# Patient Record
Sex: FEMALE | Race: WHITE | NOT HISPANIC OR LATINO | Employment: UNEMPLOYED | URBAN - METROPOLITAN AREA
[De-identification: names, ages, dates, MRNs, and addresses within clinical notes are randomized per-mention and may not be internally consistent; named-entity substitution may affect disease eponyms.]

---

## 2017-12-18 ENCOUNTER — HISTORICAL (OUTPATIENT)
Dept: CARDIOLOGY | Facility: HOSPITAL | Age: 62
End: 2017-12-18

## 2018-05-07 ENCOUNTER — HISTORICAL (OUTPATIENT)
Dept: RADIOLOGY | Facility: HOSPITAL | Age: 63
End: 2018-05-07

## 2022-04-09 ENCOUNTER — HISTORICAL (OUTPATIENT)
Dept: ADMINISTRATIVE | Facility: HOSPITAL | Age: 67
End: 2022-04-09

## 2022-04-27 VITALS
BODY MASS INDEX: 19.95 KG/M2 | SYSTOLIC BLOOD PRESSURE: 130 MMHG | WEIGHT: 131.63 LBS | OXYGEN SATURATION: 100 % | DIASTOLIC BLOOD PRESSURE: 79 MMHG | HEIGHT: 68 IN

## 2022-08-19 ENCOUNTER — HOSPITAL ENCOUNTER (OUTPATIENT)
Dept: RADIOLOGY | Facility: HOSPITAL | Age: 67
Discharge: HOME | End: 2022-08-19
Attending: INTERNAL MEDICINE
Payer: MEDICARE

## 2022-08-19 DIAGNOSIS — M47.14 MYELOPATHY OF THORACIC REGION: ICD-10-CM

## 2022-08-19 PROCEDURE — 71045 X-RAY EXAM CHEST 1 VIEW: CPT | Mod: TC

## 2022-08-22 ENCOUNTER — HOSPITAL ENCOUNTER (EMERGENCY)
Facility: HOSPITAL | Age: 67
Discharge: STILL ADMITTED OR ADMINISTRATIVE | End: 2022-08-23
Attending: INTERNAL MEDICINE
Payer: MEDICARE

## 2022-08-22 DIAGNOSIS — R79.89 ELEVATED TROPONIN: ICD-10-CM

## 2022-08-22 DIAGNOSIS — R07.9 CHEST PAIN: ICD-10-CM

## 2022-08-22 DIAGNOSIS — I21.4 NSTEMI (NON-ST ELEVATED MYOCARDIAL INFARCTION): Primary | ICD-10-CM

## 2022-08-22 LAB
ALBUMIN SERPL-MCNC: 3.5 GM/DL (ref 3.4–4.8)
ALBUMIN/GLOB SERPL: 1.2 RATIO (ref 1.1–2)
ALP SERPL-CCNC: 60 UNIT/L (ref 40–150)
ALT SERPL-CCNC: 20 UNIT/L (ref 0–55)
AST SERPL-CCNC: 19 UNIT/L (ref 5–34)
BASOPHILS # BLD AUTO: 0.01 X10(3)/MCL (ref 0–0.2)
BASOPHILS NFR BLD AUTO: 0.1 %
BILIRUBIN DIRECT+TOT PNL SERPL-MCNC: 0.4 MG/DL
BNP BLD-MCNC: 43.6 PG/ML
BUN SERPL-MCNC: 12 MG/DL (ref 9.8–20.1)
CALCIUM SERPL-MCNC: 9.8 MG/DL (ref 8.4–10.2)
CHLORIDE SERPL-SCNC: 105 MMOL/L (ref 98–107)
CO2 SERPL-SCNC: 29 MMOL/L (ref 23–31)
CREAT SERPL-MCNC: 0.67 MG/DL (ref 0.55–1.02)
EOSINOPHIL # BLD AUTO: 0.18 X10(3)/MCL (ref 0–0.9)
EOSINOPHIL NFR BLD AUTO: 1.2 %
ERYTHROCYTE [DISTWIDTH] IN BLOOD BY AUTOMATED COUNT: 15 % (ref 11.5–17)
GFR SERPLBLD CREATININE-BSD FMLA CKD-EPI: >60 MLS/MIN/1.73/M2
GLOBULIN SER-MCNC: 3 GM/DL (ref 2.4–3.5)
GLUCOSE SERPL-MCNC: 145 MG/DL (ref 82–115)
HCT VFR BLD AUTO: 36.8 % (ref 37–47)
HGB BLD-MCNC: 11.5 GM/DL (ref 12–16)
IMM GRANULOCYTES # BLD AUTO: 0.05 X10(3)/MCL (ref 0–0.04)
IMM GRANULOCYTES NFR BLD AUTO: 0.3 %
LYMPHOCYTES # BLD AUTO: 2.57 X10(3)/MCL (ref 0.6–4.6)
LYMPHOCYTES NFR BLD AUTO: 16.6 %
MCH RBC QN AUTO: 28.2 PG (ref 27–31)
MCHC RBC AUTO-ENTMCNC: 31.3 MG/DL (ref 33–36)
MCV RBC AUTO: 90.2 FL (ref 80–94)
MONOCYTES # BLD AUTO: 0.56 X10(3)/MCL (ref 0.1–1.3)
MONOCYTES NFR BLD AUTO: 3.6 %
NEUTROPHILS # BLD AUTO: 12.2 X10(3)/MCL (ref 2.1–9.2)
NEUTROPHILS NFR BLD AUTO: 78.2 %
PLATELET # BLD AUTO: 229 X10(3)/MCL (ref 130–400)
PMV BLD AUTO: 10.3 FL (ref 7.4–10.4)
POTASSIUM SERPL-SCNC: 4 MMOL/L (ref 3.5–5.1)
PROT SERPL-MCNC: 6.5 GM/DL (ref 5.8–7.6)
RBC # BLD AUTO: 4.08 X10(6)/MCL (ref 4.2–5.4)
SODIUM SERPL-SCNC: 143 MMOL/L (ref 136–145)
TROPONIN I SERPL-MCNC: 0.05 NG/ML (ref 0–0.04)
TROPONIN I SERPL-MCNC: 0.3 NG/ML (ref 0–0.04)
WBC # SPEC AUTO: 15.5 X10(3)/MCL (ref 4.5–11.5)

## 2022-08-22 PROCEDURE — 83880 ASSAY OF NATRIURETIC PEPTIDE: CPT | Performed by: INTERNAL MEDICINE

## 2022-08-22 PROCEDURE — 84484 ASSAY OF TROPONIN QUANT: CPT | Performed by: INTERNAL MEDICINE

## 2022-08-22 PROCEDURE — 80053 COMPREHEN METABOLIC PANEL: CPT | Performed by: INTERNAL MEDICINE

## 2022-08-22 PROCEDURE — 93005 ELECTROCARDIOGRAM TRACING: CPT

## 2022-08-22 PROCEDURE — 99285 EMERGENCY DEPT VISIT HI MDM: CPT | Mod: 25

## 2022-08-22 PROCEDURE — 36415 COLL VENOUS BLD VENIPUNCTURE: CPT | Performed by: INTERNAL MEDICINE

## 2022-08-22 PROCEDURE — 96372 THER/PROPH/DIAG INJ SC/IM: CPT | Performed by: INTERNAL MEDICINE

## 2022-08-22 PROCEDURE — 25000003 PHARM REV CODE 250: Performed by: INTERNAL MEDICINE

## 2022-08-22 PROCEDURE — 63600175 PHARM REV CODE 636 W HCPCS: Performed by: INTERNAL MEDICINE

## 2022-08-22 PROCEDURE — 85025 COMPLETE CBC W/AUTO DIFF WBC: CPT | Performed by: INTERNAL MEDICINE

## 2022-08-22 RX ORDER — ASPIRIN 325 MG
325 TABLET ORAL
Status: COMPLETED | OUTPATIENT
Start: 2022-08-22 | End: 2022-08-22

## 2022-08-22 RX ORDER — ENOXAPARIN SODIUM 100 MG/ML
1 INJECTION SUBCUTANEOUS
Status: COMPLETED | OUTPATIENT
Start: 2022-08-22 | End: 2022-08-22

## 2022-08-22 RX ORDER — ATORVASTATIN CALCIUM 40 MG/1
80 TABLET, FILM COATED ORAL ONCE
Status: COMPLETED | OUTPATIENT
Start: 2022-08-22 | End: 2022-08-22

## 2022-08-22 RX ADMIN — ATORVASTATIN CALCIUM 80 MG: 40 TABLET, FILM COATED ORAL at 08:08

## 2022-08-22 RX ADMIN — ASPIRIN 325 MG ORAL TABLET 325 MG: 325 PILL ORAL at 08:08

## 2022-08-22 RX ADMIN — ENOXAPARIN SODIUM 60 MG: 100 INJECTION SUBCUTANEOUS at 08:08

## 2022-08-22 NOTE — ED PROVIDER NOTES
08/23/2022         4:23 PM    Source of History:  History obtained from the patient.       Chief complaint:  From Nurse Triage:  Chest Pain (Unknown bite from insect which patient states she has anaphylactic reactions to ... states she was on the porch became diaphoretic with chest pain and took 324 mg aspirin and then 1 SL nitro. )    HPI:  Tania Figueroa is a 67 y.o. female presenting with Chest Pain (Unknown bite from insect which patient states she has anaphylactic reactions to ... states she was on the porch became diaphoretic with chest pain and took 324 mg aspirin and then 1 SL nitro. )       Patient is brought to the emergency room by medics with patient saying that she has chest pain.  Patient says that she felt something crawl in her neck, and she felt she is going into anaphylaxis because she is highly allergic to wasps if she gets bitten by 1 she goes into of anaphylaxis and although she did not get bitten by anything today but she fed after she felt a crawling in her neck that she is going into anaphylaxis she became diaphoretic and lightheaded and started having chest pain.  Medics got to her she was having chest pain so they gave her sublingual nitro and aspirin and brought her to the emergency room.  Patient is not diaphoretic anymore and is for talking in full sentences and she says she knows that she is not in anaphylaxis anymore.  She did not give herself epi shot because she says she was not gasping for air          Review of Systems   Constitutional symptoms:  No Fever. No Chills    Skin symptoms:  No Rash.    Eye symptoms:  No Visual disturbance reported.   ENMT symptoms:  No Sore throat,    Respiratory symptoms:  No Shortness of Breath, no Cough, no Wheezing.    Cardiovascular symptoms:  Chest Pain, No Palpitations, No Tachycardia.    Gastrointestinal symptoms:  No Abdominal Pain, No Nausea, No Vomiting, No Diarrhea, No Constipation.    Genitourinary symptoms:  No Dysuria,     Musculoskeletal symptoms:  No Back pain,    Neurologic symptoms:  No Headache, No Dizziness.    Psychiatric symptoms:  No Anxiety, No Depression, No Substance Abuse.              Additional review of systems information: Patient Denies Any Other Complaints.  All Other Systems Reviewed With Patient And Negative.    ALLEGIES:  Review of patient's allergies indicates:   Allergen Reactions    Codeine     Penicillins     Propoxyphene     Propoxyphene-asa-caffeine        HOME MEDICINES:  No current facility-administered medications for this encounter.    Current Outpatient Medications:     albuterol (PROVENTIL/VENTOLIN HFA) 90 mcg/actuation inhaler, Inhale 2 puffs into the lungs every 4 (four) hours as needed., Disp: , Rfl:     albuterol-ipratropium (DUO-NEB) 2.5 mg-0.5 mg/3 mL nebulizer solution, ipratropium 0.5 mg-albuterol 3 mg (2.5 mg base)/3 mL nebulization soln, Disp: , Rfl:     ascorbic acid, vitamin C, (VITAMIN C) 500 MG tablet, Take 500 mg by mouth., Disp: , Rfl:     aspirin 81 MG Chew, Take 81 mg by mouth., Disp: , Rfl:     atorvastatin (LIPITOR) 40 MG tablet, atorvastatin 40 mg tablet, Disp: , Rfl:     azithromycin (Z-DENILSON) 250 MG tablet, Zithromax Z-Denilson 250 mg tablet  TAKE 2 TABLETS (500 MG) BY ORAL ROUTE ONCE DAILY FOR 1 DAY THEN 1 TABLET (250 MG) BY ORAL ROUTE ONCE DAILY FOR 4 DAYS, Disp: , Rfl:     busPIRone (BUSPAR) 15 MG tablet, Take 15 mg by mouth., Disp: , Rfl:     carvediloL (COREG) 3.125 MG tablet, carvedilol 3.125 mg tablet, Disp: , Rfl:     coenzyme Q10 10 mg capsule, Take 10 mg by mouth., Disp: , Rfl:     denosumab (PROLIA) 60 mg/mL Syrg, Inject 60 mg into the skin., Disp: , Rfl:     lisinopriL (PRINIVIL,ZESTRIL) 30 MG tablet, Take 30 mg by mouth., Disp: , Rfl:     loratadine (CLARITIN) 10 mg tablet, Take 10 mg by mouth daily as needed., Disp: , Rfl:     losartan (COZAAR) 25 MG tablet, losartan 25 mg tablet, Disp: , Rfl:     meclizine (ANTIVERT) 25 MG tablet, Take 25 mg by mouth  daily as needed., Disp: , Rfl:     melatonin 10 mg Tab, Take by mouth., Disp: , Rfl:     polyethylene glycol (GLYCOLAX) 17 gram/dose powder, polyethylene glycol 3350 17 gram/dose oral powder, Disp: , Rfl:     potassium citrate 99 mg Cap, Take by mouth., Disp: , Rfl:     rosuvastatin (CRESTOR) 20 MG tablet, rosuvastatin 20 mg tablet, Disp: , Rfl:     PMH:  As per HPI and below:    Reviewed and updated in chart.    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     Hypercholesterolemia     Hypertension     Lumbar disc disease         PAST SURGICAL HISTORY:  Past Surgical History:   Procedure Laterality Date    APPENDECTOMY      CHOLECYSTECTOMY      OOPHORECTOMY      TONSILLECTOMY         SOCIAL HISTORY:  Social History     Tobacco Use    Smoking status: Current Every Day Smoker     Packs/day: 1.00     Types: Cigarettes    Smokeless tobacco: Never Used   Substance Use Topics    Alcohol use: Never    Drug use: Never       FAMILY HISTORY:  Family History   Problem Relation Age of Onset    Heart disease Mother     Hypertension Mother     Diabetes Mother     Sarcoidosis Mother     Stroke Father     Heart disease Father         PROBLEM LIST:  There is no problem list on file for this patient.       PHYSICAL EXAM:      ED Triage Vitals [08/22/22 1537]   /65   Pulse 80   Resp 18   Temp 97.5 °F (36.4 °C)   SpO2 97 %        Vital Signs: Reviewed As In Chart.  General:  Alert, No Cardiorespiratory Distress Noted.   Skin: Normal For Ethnic Origin  Eye:  Extraocular Movements Are Intact.   Cardiovascular:  Regular Rate And Rhythm, No Murmur, No Pedal Edema.    Respiratory:  Respirations Nonlabored, No Respiratory Distress, Good Bilateral Air Entry, No Rales, No Rhonchi.    Musculoskeletal:  No Gross Deformity Noted.   Gastrointestinal:  Soft, Non Distended, Non Tender, Normal Bowel Sounds.    Neurological:  Alert And Oriented To Person, Place, Time, And  Situation, Normal Motor Observed, Normal Speech Observed.    Psychiatric:  Cooperative, Appropriate Mood & Affect.    INITIAL IMPRESSION/ DIFFERENTIAL DX:      MEDICAL DECISION MAKING:      Reviewed Nurses Note. Reviewed Vital Signs.     Reviewed Pertinent old records, History and updated as necessary.    67 y.o. female with Chest Pain (Unknown bite from insect which patient states she has anaphylactic reactions to ... states she was on the porch became diaphoretic with chest pain and took 324 mg aspirin and then 1 SL nitro. )    Patient with chest pain and was anxious but has calmed down and is feeling better.  But she continues to have chest pain some going to do a whole workup on her decide further.    ED WORKUP AND COURSE:  ED ORDERS:  Orders Placed This Encounter   Procedures    X-Ray Chest AP Portable    CBC auto differential    Comprehensive metabolic panel    Troponin I #1    BNP    CBC with Differential    Troponin I    Diet NPO    Vital signs    Cardiac Monitoring - Adult    Inpatient consult to Telemedicine-Card    Pulse Oximetry Continuous    EKG 12-lead    EKG 12-lead    Saline lock IV    PFC Facilitated Request       Medications   aspirin tablet 325 mg (325 mg Oral Given 8/22/22 2050)   atorvastatin tablet 80 mg (80 mg Oral Given 8/22/22 2050)   enoxaparin injection 60 mg (60 mg Subcutaneous Given 8/22/22 2050)            ECG Results          EKG 12-lead (In process)  Result time 08/22/22 22:32:33    In process by Interface, Lab In [facility] (08/22/22 22:32:33)                 Narrative:    Test Reason : R77.8,    Vent. Rate : 055 BPM     Atrial Rate : 055 BPM     P-R Int : 120 ms          QRS Dur : 066 ms      QT Int : 462 ms       P-R-T Axes : 069 066 072 degrees     QTc Int : 441 ms    Sinus bradycardia  Nonspecific ST abnormality  Abnormal ECG  When compared with ECG of 22-AUG-2022 16:36,  No significant change was found    Referred By: [email or web address]   SELF           Confirmed By:                               EKG 12-lead (Preliminary result)  Result time 08/22/22 17:45:11    ED Interpretation by Ophelia Navarro MD (08/22/22 17:45:11, [facility]  Emergency Dept, Emergency Medicine)    EKG: Interpreted by Ophelia Navarro MD. independently as Normal Sinus Rhythm, Rate 61, Normal Axis, Normal Intervals., normal EKG  Artifact in the baseline                              ED LABS ORDERED AND REVIEWED:  Admission on 08/22/2022   Component Date Value Ref Range Status    Sodium Level 08/22/2022 143  136 - 145 mmol/L Final    Potassium Level 08/22/2022 4.0  3.5 - 5.1 mmol/L Final    Chloride 08/22/2022 105  98 - 107 mmol/L Final    Carbon Dioxide 08/22/2022 29  23 - 31 mmol/L Final    Glucose Level 08/22/2022 145 (A) 82 - 115 mg/dL Final    Blood Urea Nitrogen 08/22/2022 12.0  9.8 - 20.1 mg/dL Final    Creatinine 08/22/2022 0.67  0.55 - 1.02 mg/dL Final    Calcium Level Total 08/22/2022 9.8  8.4 - 10.2 mg/dL Final    Protein Total 08/22/2022 6.5  5.8 - 7.6 gm/dL Final    Albumin Level 08/22/2022 3.5  3.4 - 4.8 gm/dL Final    Globulin 08/22/2022 3.0  2.4 - 3.5 gm/dL Final    Albumin/Globulin Ratio 08/22/2022 1.2  1.1 - 2.0 ratio Final    Bilirubin Total 08/22/2022 0.4  <=1.5 mg/dL Final    Alkaline Phosphatase 08/22/2022 60  40 - 150 unit/L Final    Alanine Aminotransferase 08/22/2022 20  0 - 55 unit/L Final    Aspartate Aminotransferase 08/22/2022 19  5 - 34 unit/L Final    eGFR 08/22/2022 >60  mls/min/1.73/m2 Final    Troponin-I 08/22/2022 0.048 (A) 0.000 - 0.045 ng/mL Final    Natriuretic Peptide 08/22/2022 43.6  <=100.0 pg/mL Final    WBC 08/22/2022 15.5 (A) 4.5 - 11.5 x10(3)/mcL Final    RBC 08/22/2022 4.08 (A) 4.20 - 5.40 x10(6)/mcL Final    Hgb 08/22/2022 11.5 (A) 12.0 - 16.0 gm/dL Final    Hct 08/22/2022 36.8 (A) 37.0 - 47.0 % Final    MCV 08/22/2022 90.2  80.0 - 94.0 fL Final    MCH 08/22/2022 28.2  27.0 - 31.0 pg Final    MCHC 08/22/2022 31.3 (A) 33.0 - 36.0  mg/dL Final    RDW 08/22/2022 15.0  11.5 - 17.0 % Final    Platelet 08/22/2022 229  130 - 400 x10(3)/mcL Final    MPV 08/22/2022 10.3  7.4 - 10.4 fL Final    Neut % 08/22/2022 78.2  % Final    Lymph % 08/22/2022 16.6  % Final    Mono % 08/22/2022 3.6  % Final    Eos % 08/22/2022 1.2  % Final    Basophil % 08/22/2022 0.1  % Final    Lymph # 08/22/2022 2.57  0.6 - 4.6 x10(3)/mcL Final    Neut # 08/22/2022 12.2 (A) 2.1 - 9.2 x10(3)/mcL Final    Mono # 08/22/2022 0.56  0.1 - 1.3 x10(3)/mcL Final    Eos # 08/22/2022 0.18  0 - 0.9 x10(3)/mcL Final    Baso # 08/22/2022 0.01  0 - 0.2 x10(3)/mcL Final    IG# 08/22/2022 0.05 (A) 0 - 0.04 x10(3)/mcL Final    IG% 08/22/2022 0.3  % Final    Troponin-I 08/22/2022 0.303 (A) 0.000 - 0.045 ng/mL Final       RADIOLOGY STUDIES ORDERED AND REVIEWED:  Imaging Results          X-Ray Chest AP Portable (Final result)  Result time 08/22/22 16:55:33    Final result by Isaac Georges MD (08/22/22 16:55:33)                 Impression:      1. No active cardiopulmonary disease identified      Electronically signed by: Isaac Georges  Date:    08/22/2022  Time:    16:55             Narrative:    EXAMINATION:  XR CHEST AP PORTABLE    CLINICAL HISTORY:  Chest Pain;, .    COMPARISON:  08/19/2022    FINDINGS:  An AP view or more reveals the heart to be normal in size.  The trachea is midline.  Atherosclerosis is seen within the aorta.  Hazy interstitial opacities are scattered throughout the lungs bilaterally.  Degenerative changes and curvature are noted to the thoracic spine.  Bony structures are osteopenic.                                ED COURSE AND REEVALUATIONS:  Vitals:    08/23/22 0300   BP: 135/61   Pulse: (!) 44   Resp: 16   Temp:    Patient is doing better, she has come, she is not diaphoretic, her troponin is slightly abnormal, I am going to repeat her troponin and let Dr. Macedo decide about disposition.    PROCEDURES PERFORMED IN ED:  Procedures    ED Course as of  08/23/22 0530   Mon Aug 22, 2022   1830 ANGELLA BRODY MD, assumed care at 1750.  Agree with above care plan and documentation.   Patient seen and examined.  There have ordered repeat troponin due to patient's risk factors to be done 730 [PL]   2035 Patient's repeat troponin is positive at 0.3 up trending from 0.0 [PL]   2035 Troponin I(!): 0.303 [PL]   2036 Up trending from 0.04.  Have ordered aspirin 325 atorvastatin 80 and Lovenox 1 milligram/kilogram body weight.  Patient is has a heart rate in the 50s so have not given metoprolol.  In speaking with patient she is due to have some sort of spinal procedure neck is week with Dr. Almeida at the [facility] so she has been off of her regular medications including her aspirin for the past few days [PL]   2037 EKG 12-lead  EKG done at 8:25 p.m. shows sinus bradycardia with some nonspecific T-wave abnormalities with a ventricular rate of 55 beats per minute.  Patient is chest pain-free but has nitroglycerin paste on her chest wall [PL]   2156 Spoke with cardiologist via telehealth consult who feels the patient needs to be transferred for inpatient cardiology monitoring for the diagnosis of NSTEMI I have placed a PFC transfer request [PL]   Tue Aug 23, 2022   0100 Give report to hospitalist at [facility] who accepts as a transfer and they will be calling back with a bed assignment [PL]      ED Course User Index  [PL] Eze Macedo MD      Admission on 08/22/2022   Component Date Value Ref Range Status    Sodium Level 08/22/2022 143  136 - 145 mmol/L Final    Potassium Level 08/22/2022 4.0  3.5 - 5.1 mmol/L Final    Chloride 08/22/2022 105  98 - 107 mmol/L Final    Carbon Dioxide 08/22/2022 29  23 - 31 mmol/L Final    Glucose Level 08/22/2022 145 (A) 82 - 115 mg/dL Final    Blood Urea Nitrogen 08/22/2022 12.0  9.8 - 20.1 mg/dL Final    Creatinine 08/22/2022 0.67  0.55 - 1.02 mg/dL Final    Calcium Level Total 08/22/2022 9.8  8.4  - 10.2 mg/dL Final    Protein Total 08/22/2022 6.5  5.8 - 7.6 gm/dL Final    Albumin Level 08/22/2022 3.5  3.4 - 4.8 gm/dL Final    Globulin 08/22/2022 3.0  2.4 - 3.5 gm/dL Final    Albumin/Globulin Ratio 08/22/2022 1.2  1.1 - 2.0 ratio Final    Bilirubin Total 08/22/2022 0.4  <=1.5 mg/dL Final    Alkaline Phosphatase 08/22/2022 60  40 - 150 unit/L Final    Alanine Aminotransferase 08/22/2022 20  0 - 55 unit/L Final    Aspartate Aminotransferase 08/22/2022 19  5 - 34 unit/L Final    eGFR 08/22/2022 >60  mls/min/1.73/m2 Final    Troponin-I 08/22/2022 0.048 (A) 0.000 - 0.045 ng/mL Final    Natriuretic Peptide 08/22/2022 43.6  <=100.0 pg/mL Final    WBC 08/22/2022 15.5 (A) 4.5 - 11.5 x10(3)/mcL Final    RBC 08/22/2022 4.08 (A) 4.20 - 5.40 x10(6)/mcL Final    Hgb 08/22/2022 11.5 (A) 12.0 - 16.0 gm/dL Final    Hct 08/22/2022 36.8 (A) 37.0 - 47.0 % Final    MCV 08/22/2022 90.2  80.0 - 94.0 fL Final    MCH 08/22/2022 28.2  27.0 - 31.0 pg Final    MCHC 08/22/2022 31.3 (A) 33.0 - 36.0 mg/dL Final    RDW 08/22/2022 15.0  11.5 - 17.0 % Final    Platelet 08/22/2022 229  130 - 400 x10(3)/mcL Final    MPV 08/22/2022 10.3  7.4 - 10.4 fL Final    Neut % 08/22/2022 78.2  % Final    Lymph % 08/22/2022 16.6  % Final    Mono % 08/22/2022 3.6  % Final    Eos % 08/22/2022 1.2  % Final    Basophil % 08/22/2022 0.1  % Final    Lymph # 08/22/2022 2.57  0.6 - 4.6 x10(3)/mcL Final    Neut # 08/22/2022 12.2 (A) 2.1 - 9.2 x10(3)/mcL Final    Mono # 08/22/2022 0.56  0.1 - 1.3 x10(3)/mcL Final    Eos # 08/22/2022 0.18  0 - 0.9 x10(3)/mcL Final    Baso # 08/22/2022 0.01  0 - 0.2 x10(3)/mcL Final    IG# 08/22/2022 0.05 (A) 0 - 0.04 x10(3)/mcL Final    IG% 08/22/2022 0.3  % Final    Troponin-I 08/22/2022 0.303 (A) 0.000 - 0.045 ng/mL Final             DIAGNOSTIC IMPRESSION:      1. NSTEMI (non-ST elevated myocardial infarction)    2. Chest pain    3. Elevated troponin         ED Disposition Condition    Transfer to  Another Facility Stable             Medication List      ASK your doctor about these medications    albuterol 90 mcg/actuation inhaler  Commonly known as: PROVENTIL/VENTOLIN HFA     albuterol-ipratropium 2.5 mg-0.5 mg/3 mL nebulizer solution  Commonly known as: DUO-NEB     ascorbic acid (vitamin C) 500 MG tablet  Commonly known as: VITAMIN C     aspirin 81 MG Chew     atorvastatin 40 MG tablet  Commonly known as: LIPITOR     azithromycin 250 MG tablet  Commonly known as: Z-DENILSON     busPIRone 15 MG tablet  Commonly known as: BUSPAR     carvediloL 3.125 MG tablet  Commonly known as: COREG     coenzyme Q10 10 mg capsule     lisinopriL 30 MG tablet  Commonly known as: PRINIVIL,ZESTRIL     loratadine 10 mg tablet  Commonly known as: CLARITIN     losartan 25 MG tablet  Commonly known as: COZAAR     meclizine 25 MG tablet  Commonly known as: ANTIVERT     melatonin 10 mg Tab     polyethylene glycol 17 gram/dose powder  Commonly known as: GLYCOLAX     potassium citrate 99 mg Cap     PROLIA 60 mg/mL Syrg  Generic drug: denosumab     rosuvastatin 20 MG tablet  Commonly known as: CRESTOR                        Eze Macedo MD  08/22/22 4302    Natacha Allan is a certified MA and was present during the entire interaction with this patient     Eze Macedo MD  08/22/22 8740       Eze Macedo MD  08/23/22 0553

## 2022-08-22 NOTE — ED NOTES
Pt to ED via EMS for chest pain. Pt was sitting on her porch and felt something on her neck, pt then became anxious due to being highly allergic to insects. Pt started having chest pain at home and became diaphoretic. Pt in no distress, moves all extremities, all safety and personal needs addressed. Bp/pulse ox/ cardiac monitor in place.

## 2022-08-23 ENCOUNTER — HOSPITAL ENCOUNTER (INPATIENT)
Facility: HOSPITAL | Age: 67
LOS: 2 days | Discharge: HOME | DRG: 282 | End: 2022-08-25
Attending: FAMILY MEDICINE | Admitting: FAMILY MEDICINE
Payer: MEDICARE

## 2022-08-23 VITALS
DIASTOLIC BLOOD PRESSURE: 77 MMHG | OXYGEN SATURATION: 100 % | WEIGHT: 135 LBS | SYSTOLIC BLOOD PRESSURE: 119 MMHG | HEART RATE: 53 BPM | BODY MASS INDEX: 21.69 KG/M2 | TEMPERATURE: 98 F | HEIGHT: 66 IN | RESPIRATION RATE: 16 BRPM

## 2022-08-23 DIAGNOSIS — R07.9 CHEST PAIN: ICD-10-CM

## 2022-08-23 DIAGNOSIS — R00.1 BRADYCARDIA: ICD-10-CM

## 2022-08-23 DIAGNOSIS — I21.4 NSTEMI (NON-ST ELEVATED MYOCARDIAL INFARCTION): ICD-10-CM

## 2022-08-23 PROBLEM — E78.5 HLD (HYPERLIPIDEMIA): Status: ACTIVE | Noted: 2022-08-23

## 2022-08-23 PROBLEM — I10 ESSENTIAL HYPERTENSION: Status: ACTIVE | Noted: 2022-08-23

## 2022-08-23 LAB
ABO + RH BLD: NORMAL
ANION GAP SERPL CALC-SCNC: 9 MMOL/L (ref 8–16)
APTT BLDCRRT: 28.8 SEC (ref 21–32)
BASOPHILS # BLD AUTO: 0.03 K/UL (ref 0–0.2)
BASOPHILS NFR BLD: 0.3 % (ref 0–1.9)
BLD GP AB SCN CELLS X3 SERPL QL: NORMAL
BNP SERPL-MCNC: 42 PG/ML (ref 0–99)
BUN SERPL-MCNC: 11 MG/DL (ref 8–23)
CALCIUM SERPL-MCNC: 9.4 MG/DL (ref 8.7–10.5)
CHLORIDE SERPL-SCNC: 106 MMOL/L (ref 95–110)
CO2 SERPL-SCNC: 27 MMOL/L (ref 23–29)
CREAT SERPL-MCNC: 0.7 MG/DL (ref 0.5–1.4)
DIFFERENTIAL METHOD: ABNORMAL
EOSINOPHIL # BLD AUTO: 0.3 K/UL (ref 0–0.5)
EOSINOPHIL NFR BLD: 2.8 % (ref 0–8)
ERYTHROCYTE [DISTWIDTH] IN BLOOD BY AUTOMATED COUNT: 14.9 % (ref 11.5–14.5)
EST. GFR  (NO RACE VARIABLE): >60 ML/MIN/1.73 M^2
GLUCOSE SERPL-MCNC: 86 MG/DL (ref 70–110)
HCT VFR BLD AUTO: 35.6 % (ref 37–48.5)
HGB BLD-MCNC: 10.8 G/DL (ref 12–16)
IMM GRANULOCYTES # BLD AUTO: 0.12 K/UL (ref 0–0.04)
IMM GRANULOCYTES NFR BLD AUTO: 1.1 % (ref 0–0.5)
LYMPHOCYTES # BLD AUTO: 3.7 K/UL (ref 1–4.8)
LYMPHOCYTES NFR BLD: 32.9 % (ref 18–48)
MCH RBC QN AUTO: 27.4 PG (ref 27–31)
MCHC RBC AUTO-ENTMCNC: 30.3 G/DL (ref 32–36)
MCV RBC AUTO: 90 FL (ref 82–98)
MONOCYTES # BLD AUTO: 0.8 K/UL (ref 0.3–1)
MONOCYTES NFR BLD: 7.5 % (ref 4–15)
NEUTROPHILS # BLD AUTO: 6.2 K/UL (ref 1.8–7.7)
NEUTROPHILS NFR BLD: 55.4 % (ref 38–73)
NRBC BLD-RTO: 0 /100 WBC
PLATELET # BLD AUTO: 226 K/UL (ref 150–450)
PMV BLD AUTO: 10.7 FL (ref 9.2–12.9)
POTASSIUM SERPL-SCNC: 3.7 MMOL/L (ref 3.5–5.1)
RBC # BLD AUTO: 3.94 M/UL (ref 4–5.4)
SODIUM SERPL-SCNC: 142 MMOL/L (ref 136–145)
TROPONIN I SERPL DL<=0.01 NG/ML-MCNC: 0.03 NG/ML (ref 0–0.03)
TROPONIN I SERPL-MCNC: 0.11 NG/ML (ref 0–0.04)
WBC # BLD AUTO: 11.16 K/UL (ref 3.9–12.7)

## 2022-08-23 PROCEDURE — 36415 COLL VENOUS BLD VENIPUNCTURE: CPT | Performed by: FAMILY MEDICINE

## 2022-08-23 PROCEDURE — 85025 COMPLETE CBC W/AUTO DIFF WBC: CPT | Performed by: FAMILY MEDICINE

## 2022-08-23 PROCEDURE — 86850 RBC ANTIBODY SCREEN: CPT | Performed by: FAMILY MEDICINE

## 2022-08-23 PROCEDURE — 63600175 PHARM REV CODE 636 W HCPCS: Performed by: FAMILY MEDICINE

## 2022-08-23 PROCEDURE — 85730 THROMBOPLASTIN TIME PARTIAL: CPT | Performed by: FAMILY MEDICINE

## 2022-08-23 PROCEDURE — 84484 ASSAY OF TROPONIN QUANT: CPT | Performed by: EMERGENCY MEDICINE

## 2022-08-23 PROCEDURE — 21400001 HC TELEMETRY ROOM

## 2022-08-23 PROCEDURE — 96372 THER/PROPH/DIAG INJ SC/IM: CPT | Performed by: INTERNAL MEDICINE

## 2022-08-23 PROCEDURE — 80048 BASIC METABOLIC PNL TOTAL CA: CPT | Performed by: FAMILY MEDICINE

## 2022-08-23 PROCEDURE — 63600175 PHARM REV CODE 636 W HCPCS: Performed by: INTERNAL MEDICINE

## 2022-08-23 PROCEDURE — 84484 ASSAY OF TROPONIN QUANT: CPT | Performed by: FAMILY MEDICINE

## 2022-08-23 PROCEDURE — 83880 ASSAY OF NATRIURETIC PEPTIDE: CPT | Performed by: FAMILY MEDICINE

## 2022-08-23 PROCEDURE — 36415 COLL VENOUS BLD VENIPUNCTURE: CPT | Performed by: EMERGENCY MEDICINE

## 2022-08-23 RX ORDER — MECLIZINE HCL 25MG 25 MG/1
25 TABLET, CHEWABLE ORAL DAILY PRN
Status: ON HOLD | COMMUNITY
End: 2022-08-25 | Stop reason: HOSPADM

## 2022-08-23 RX ORDER — HYDRALAZINE HYDROCHLORIDE 20 MG/ML
10 INJECTION INTRAMUSCULAR; INTRAVENOUS EVERY 6 HOURS PRN
Status: DISCONTINUED | OUTPATIENT
Start: 2022-08-23 | End: 2022-08-25 | Stop reason: HOSPADM

## 2022-08-23 RX ORDER — DENOSUMAB 60 MG/ML
60 INJECTION SUBCUTANEOUS
COMMUNITY

## 2022-08-23 RX ORDER — ACETAMINOPHEN 325 MG/1
650 TABLET ORAL EVERY 6 HOURS PRN
Status: DISCONTINUED | OUTPATIENT
Start: 2022-08-23 | End: 2022-08-24 | Stop reason: SDUPTHER

## 2022-08-23 RX ORDER — CARVEDILOL 3.12 MG/1
3.12 TABLET ORAL 2 TIMES DAILY
Status: DISCONTINUED | OUTPATIENT
Start: 2022-08-23 | End: 2022-08-23

## 2022-08-23 RX ORDER — CALCIUM CARB, CITRATE, MALATE 250 MG
CAPSULE ORAL
Status: ON HOLD | COMMUNITY
End: 2022-08-25 | Stop reason: HOSPADM

## 2022-08-23 RX ORDER — ACETAMINOPHEN, DIPHENHYDRAMINE HCL, PHENYLEPHRINE HCL 325; 25; 5 MG/1; MG/1; MG/1
TABLET ORAL
Status: ON HOLD | COMMUNITY
End: 2022-08-25 | Stop reason: HOSPADM

## 2022-08-23 RX ORDER — AZITHROMYCIN 250 MG/1
TABLET, FILM COATED ORAL
Status: ON HOLD | COMMUNITY
End: 2022-08-25 | Stop reason: HOSPADM

## 2022-08-23 RX ORDER — NAPROXEN SODIUM 220 MG/1
81 TABLET, FILM COATED ORAL
COMMUNITY

## 2022-08-23 RX ORDER — ASCORBIC ACID 500 MG
500 TABLET ORAL
Status: ON HOLD | COMMUNITY
End: 2022-08-25 | Stop reason: HOSPADM

## 2022-08-23 RX ORDER — NITROGLYCERIN 0.4 MG/1
0.4 TABLET SUBLINGUAL EVERY 5 MIN PRN
Status: DISCONTINUED | OUTPATIENT
Start: 2022-08-23 | End: 2022-08-25 | Stop reason: HOSPADM

## 2022-08-23 RX ORDER — POLYETHYLENE GLYCOL 3350 17 G/17G
POWDER, FOR SOLUTION ORAL
Status: ON HOLD | COMMUNITY
End: 2022-08-25 | Stop reason: HOSPADM

## 2022-08-23 RX ORDER — LORATADINE 10 MG/1
10 TABLET ORAL DAILY PRN
Status: ON HOLD | COMMUNITY
End: 2022-08-25 | Stop reason: HOSPADM

## 2022-08-23 RX ORDER — AA/PROT/LYSINE/METHIO/VIT C/B6 50-12.5 MG
10 TABLET ORAL
Status: ON HOLD | COMMUNITY
End: 2022-08-25 | Stop reason: HOSPADM

## 2022-08-23 RX ORDER — SODIUM CHLORIDE 0.9 % (FLUSH) 0.9 %
10 SYRINGE (ML) INJECTION
Status: DISCONTINUED | OUTPATIENT
Start: 2022-08-23 | End: 2022-08-25 | Stop reason: HOSPADM

## 2022-08-23 RX ORDER — ALBUTEROL SULFATE 90 UG/1
2 AEROSOL, METERED RESPIRATORY (INHALATION) EVERY 4 HOURS PRN
COMMUNITY
Start: 2022-01-07

## 2022-08-23 RX ORDER — HYDRALAZINE HYDROCHLORIDE 20 MG/ML
10 INJECTION INTRAMUSCULAR; INTRAVENOUS EVERY 6 HOURS PRN
Status: DISCONTINUED | OUTPATIENT
Start: 2022-08-23 | End: 2022-08-23 | Stop reason: SDUPTHER

## 2022-08-23 RX ORDER — HEPARIN SODIUM,PORCINE/D5W 25000/250
0-40 INTRAVENOUS SOLUTION INTRAVENOUS CONTINUOUS
Status: DISCONTINUED | OUTPATIENT
Start: 2022-08-23 | End: 2022-08-24

## 2022-08-23 RX ORDER — CARVEDILOL 3.12 MG/1
TABLET ORAL
Status: ON HOLD | COMMUNITY
End: 2022-08-25 | Stop reason: HOSPADM

## 2022-08-23 RX ORDER — ONDANSETRON 4 MG/1
4 TABLET, FILM COATED ORAL EVERY 6 HOURS PRN
Status: DISCONTINUED | OUTPATIENT
Start: 2022-08-23 | End: 2022-08-25 | Stop reason: HOSPADM

## 2022-08-23 RX ORDER — LISINOPRIL 30 MG/1
30 TABLET ORAL
Status: ON HOLD | COMMUNITY
End: 2022-08-25 | Stop reason: HOSPADM

## 2022-08-23 RX ORDER — LOSARTAN POTASSIUM 25 MG/1
TABLET ORAL
COMMUNITY

## 2022-08-23 RX ORDER — ATORVASTATIN CALCIUM 40 MG/1
40 TABLET, FILM COATED ORAL DAILY
Status: DISCONTINUED | OUTPATIENT
Start: 2022-08-24 | End: 2022-08-25 | Stop reason: HOSPADM

## 2022-08-23 RX ORDER — NAPROXEN SODIUM 220 MG/1
81 TABLET, FILM COATED ORAL DAILY
Status: DISCONTINUED | OUTPATIENT
Start: 2022-08-24 | End: 2022-08-25 | Stop reason: HOSPADM

## 2022-08-23 RX ORDER — ROSUVASTATIN CALCIUM 20 MG/1
TABLET, COATED ORAL
Status: ON HOLD | COMMUNITY
End: 2022-08-25 | Stop reason: HOSPADM

## 2022-08-23 RX ORDER — ENOXAPARIN SODIUM 100 MG/ML
1 INJECTION SUBCUTANEOUS
Status: DISCONTINUED | OUTPATIENT
Start: 2022-08-23 | End: 2022-08-23 | Stop reason: HOSPADM

## 2022-08-23 RX ORDER — ATORVASTATIN CALCIUM 40 MG/1
TABLET, FILM COATED ORAL
COMMUNITY

## 2022-08-23 RX ORDER — BUSPIRONE HYDROCHLORIDE 15 MG/1
15 TABLET ORAL
Status: ON HOLD | COMMUNITY
End: 2022-08-25 | Stop reason: HOSPADM

## 2022-08-23 RX ORDER — IPRATROPIUM BROMIDE AND ALBUTEROL SULFATE 2.5; .5 MG/3ML; MG/3ML
SOLUTION RESPIRATORY (INHALATION)
Status: ON HOLD | COMMUNITY
End: 2022-08-25 | Stop reason: HOSPADM

## 2022-08-23 RX ADMIN — ENOXAPARIN SODIUM 60 MG: 100 INJECTION SUBCUTANEOUS at 08:08

## 2022-08-23 RX ADMIN — HEPARIN SODIUM AND DEXTROSE 12 UNITS/KG/HR: 10000; 5 INJECTION INTRAVENOUS at 10:08

## 2022-08-23 NOTE — ED PROVIDER NOTES
Encounter Date: 8/22/2022       History     Chief Complaint   Patient presents with    Chest Pain     Unknown bite from insect which patient states she has anaphylactic reactions to ... states she was on the porch became diaphoretic with chest pain and took 324 mg aspirin and then 1 SL nitro.      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     Hypercholesterolemia     Hypertension     Lumbar disc disease      Past Surgical History:   Procedure Laterality Date    APPENDECTOMY      CHOLECYSTECTOMY      OOPHORECTOMY      TONSILLECTOMY       Family History   Problem Relation Age of Onset    Heart disease Mother     Hypertension Mother     Diabetes Mother     Sarcoidosis Mother     Stroke Father     Heart disease Father      Social History     Tobacco Use    Smoking status: Current Every Day Smoker     Packs/day: 1.00     Types: Cigarettes    Smokeless tobacco: Never Used   Substance Use Topics    Alcohol use: Never    Drug use: Never     Review of Systems    Physical Exam     Initial Vitals [08/22/22 1537]   BP Pulse Resp Temp SpO2   123/65 80 18 97.5 °F (36.4 °C) 97 %      MAP       --         Physical Exam    ED Course   Procedures  Labs Reviewed   COMPREHENSIVE METABOLIC PANEL - Abnormal; Notable for the following components:       Result Value    Glucose Level 145 (*)     All other components within normal limits   TROPONIN I - Abnormal; Notable for the following components:    Troponin-I 0.048 (*)     All other components within normal limits   CBC WITH DIFFERENTIAL - Abnormal; Notable for the following components:    WBC 15.5 (*)     RBC 4.08 (*)     Hgb 11.5 (*)     Hct 36.8 (*)     MCHC 31.3 (*)     Neut # 12.2 (*)     IG# 0.05 (*)     All other components within normal limits   TROPONIN I - Abnormal; Notable for  the following components:    Troponin-I 0.303 (*)     All other components within normal limits   TROPONIN I - Abnormal; Notable for the following components:    Troponin-I 0.106 (*)     All other components within normal limits   B-TYPE NATRIURETIC PEPTIDE - Normal   CBC W/ AUTO DIFFERENTIAL    Narrative:     The following orders were created for panel order CBC auto differential.  Procedure                               Abnormality         Status                     ---------                               -----------         ------                     CBC with Differential[[id number]]        Abnormal            Final result                 Please view results for these tests on the individual orders.        ECG Results          EKG 12-lead (In process)  Result time 08/22/22 22:32:33    In process by Interface, Lab In [facility] (08/22/22 22:32:33)                 Narrative:    Test Reason : R77.8,    Vent. Rate : 055 BPM     Atrial Rate : 055 BPM     P-R Int : 120 ms          QRS Dur : 066 ms      QT Int : 462 ms       P-R-T Axes : 069 066 072 degrees     QTc Int : 441 ms    Sinus bradycardia  Nonspecific ST abnormality  Abnormal ECG  When compared with ECG of 22-AUG-2022 16:36,  No significant change was found    Referred By: [email or web address]   SELF           Confirmed By:                              EKG 12-lead (Preliminary result)  Result time 08/22/22 17:45:11    ED Interpretation by Ophelia Navarro MD (08/22/22 17:45:11, [facility] - Emergency Dept, Emergency Medicine)    EKG: Interpreted by Ophelia Navarro MD. independently as Normal Sinus Rhythm, Rate 61, Normal Axis, Normal Intervals., normal EKG  Artifact in the baseline                            Imaging Results          X-Ray Chest AP Portable (Final result)  Result time 08/22/22 16:55:33    Final result by Isaac Georges MD (08/22/22 16:55:33)                 Impression:      1. No active cardiopulmonary disease identified      Electronically  signed by: Isaac Georges  Date:    08/22/2022  Time:    16:55             Narrative:    EXAMINATION:  XR CHEST AP PORTABLE    CLINICAL HISTORY:  Chest Pain;, .    COMPARISON:  08/19/2022    FINDINGS:  An AP view or more reveals the heart to be normal in size.  The trachea is midline.  Atherosclerosis is seen within the aorta.  Hazy interstitial opacities are scattered throughout the lungs bilaterally.  Degenerative changes and curvature are noted to the thoracic spine.  Bony structures are osteopenic.                                 Medications   enoxaparin injection 60 mg (has no administration in time range)   aspirin tablet 325 mg (325 mg Oral Given 8/22/22 2050)   atorvastatin tablet 80 mg (80 mg Oral Given 8/22/22 2050)   enoxaparin injection 60 mg (60 mg Subcutaneous Given 8/22/22 2050)                 ED Course as of 08/23/22 0830   Mon Aug 22, 2022   1830 ANGELLA BRODY MD, assumed care at 1750.  Agree with above care plan and documentation.   Patient seen and examined.  There have ordered repeat troponin due to patient's risk factors to be done 730 [PL]   2035 Patient's repeat troponin is positive at 0.3 up trending from 0.0 [PL]   2035 Troponin I(!): 0.303 [PL]   2036 Up trending from 0.04.  Have ordered aspirin 325 atorvastatin 80 and Lovenox 1 milligram/kilogram body weight.  Patient is has a heart rate in the 50s so have not given metoprolol.  In speaking with patient she is due to have some sort of spinal procedure neck is week with Dr. Almeida at the [facility] so she has been off of her regular medications including her aspirin for the past few days [PL]   2037 EKG 12-lead  EKG done at 8:25 p.m. shows sinus bradycardia with some nonspecific T-wave abnormalities with a ventricular rate of 55 beats per minute.  Patient is chest pain-free but has nitroglycerin paste on her chest wall [PL]   2156 Spoke with cardiologist via telehealth consult who feels the patient needs to be transferred for  inpatient cardiology monitoring for the diagnosis of NSTEMI I have placed a PFC transfer request [PL]   Tue Aug 23, 2022   0100 Give report to hospitalist at [facility] who accepts as a transfer and they will be calling back with a bed assignment [PL]      ED Course User Index  [PL] Eze Macedo MD             Clinical Impression:   Final diagnoses:  [R07.9] Chest pain  [R77.8] Elevated troponin  [I21.4] NSTEMI (non-ST elevated myocardial infarction) (Primary)          ED Disposition Condition    Transfer to Another Facility Stable         0830:  Pt accepted by Dr. Plascencia at [facility].     Sebastian Hayden MD  08/23/22 0815

## 2022-08-23 NOTE — PLAN OF CARE
Pt remains free from falls/injuries this shift. Safety precautions maintained. No complaints of chest pain. No s/s of acute distress noted. Will continue to monitor. Chart check completed.

## 2022-08-23 NOTE — CARE UPDATE
Hospital medicine was not notified of pt arrival. Hospital medicine just noticed pt was on Telemetry hospital list. Stephany Suazo NP notified of admit.

## 2022-08-23 NOTE — PROVIDER TRANSFER
Outside Transfer Acceptance Note / Regional Referral Center    Referring facility: [facility]   Referring provider: MEGHAN FERRARA  Accepting facility: [facility]  Accepting provider: STEPHAN ECHEVARRIA  Admitting provider: SHAWNA BETTENCOURT  Reason for transfer: Higher Level of Care   Transfer diagnosis: NSTEMI  Transfer specialty requested: Cardiology  Transfer specialty notified: yes  Transfer level: NUMBER 1-5: 2  Bed type requested: Telemetry  Isolation status: No active isolations   Admission class or status: Inpatient    Narrative     68 y/o F with PMH of HTN and HLD was brought to ED by EMS for evaluation of chest pain.  She initially believed she was having an anaphylactic reaction to an insect bite, as she became diaphoretic, light-headed, and developed chest pain.  She was not stung by insect, but had feeling of impending doom.  She was given SL NTG and ASA 324mg en route to ED.  +tobacco use.      Of note, pt uses a wheelchair currently due to lumbar disc disease.  She is scheduled for surgery on 8/31.  In preparation for this, she has not been taking her home beta blocker, statin, or ASA.    VS on arrival: AF HR 80 18  123/65. 97% on 2L  2300:  HR 50. 19. 113/54. 96% on RA    WBC 15.5  H/H 11.5/36.8    Trop 0.048 -> 0.303    CXR: No active cardiopulmonary disease identified  EKG: sinus bradycardia at 55 bpm with nonspecific T-wave abnormalities    With concern for NSTEMI, Cardiology was consulted via telehealth, who recommends transfer to facility for inpatient Cardiology evaluation.  Pt has been given ASA 325mg, Atorvastatin 80mg, Lovenox 60mg; defer Metoprolol due to bradycardia.  Currently has NTG paste on and is free of chest pain.      Instructions      [facility]  Admit to Hospital Medicine  Upon patient arrival to floor, please contact Hospital Medicine on call.

## 2022-08-23 NOTE — Clinical Note
The left ventricle was injected. The injected rate was 4 mL/sec. The total injected volume was 20 mL.

## 2022-08-24 PROBLEM — R00.1 BRADYCARDIA: Status: ACTIVE | Noted: 2022-08-24

## 2022-08-24 PROBLEM — Z72.0 TOBACCO ABUSE: Status: ACTIVE | Noted: 2022-08-24

## 2022-08-24 LAB
AORTIC ROOT ANNULUS: 2.86 CM
APTT BLDCRRT: 39.7 SEC (ref 21–32)
APTT BLDCRRT: 47.1 SEC (ref 21–32)
ASCENDING AORTA: 2.53 CM
AV INDEX (PROSTH): 0.78
AV MEAN GRADIENT: 5 MMHG
AV PEAK GRADIENT: 12 MMHG
AV VALVE AREA: 1.84 CM2
AV VELOCITY RATIO: 0.81
BSA FOR ECHO PROCEDURE: 1.56 M2
CATH EF ESTIMATED: 60 %
CV ECHO LV RWT: 0.47 CM
DOP CALC AO PEAK VEL: 1.72 M/S
DOP CALC AO VTI: 40.1 CM
DOP CALC LVOT AREA: 2.3 CM2
DOP CALC LVOT DIAMETER: 1.73 CM
DOP CALC LVOT PEAK VEL: 1.4 M/S
DOP CALC LVOT STROKE VOLUME: 73.77 CM3
DOP CALC RVOT PEAK VEL: 0.77 M/S
DOP CALC RVOT VTI: 21.5 CM
DOP CALCLVOT PEAK VEL VTI: 31.4 CM
E WAVE DECELERATION TIME: 251.21 MSEC
E/A RATIO: 1.07
E/E' RATIO: 14.77 M/S
ECHO LV POSTERIOR WALL: 0.97 CM (ref 0.6–1.1)
EJECTION FRACTION: 65 %
FRACTIONAL SHORTENING: 32 % (ref 28–44)
INTERVENTRICULAR SEPTUM: 0.94 CM (ref 0.6–1.1)
IVC DIAMETER: 2.04 CM
IVRT: 72.31 MSEC
LA MAJOR: 4.28 CM
LA MINOR: 4.47 CM
LA WIDTH: 3.4 CM
LEFT ATRIUM SIZE: 3.1 CM
LEFT ATRIUM VOLUME INDEX: 24.8 ML/M2
LEFT ATRIUM VOLUME: 39.18 CM3
LEFT INTERNAL DIMENSION IN SYSTOLE: 2.81 CM (ref 2.1–4)
LEFT VENTRICLE DIASTOLIC VOLUME INDEX: 48.64 ML/M2
LEFT VENTRICLE DIASTOLIC VOLUME: 76.85 ML
LEFT VENTRICLE MASS INDEX: 80 G/M2
LEFT VENTRICLE SYSTOLIC VOLUME INDEX: 18.8 ML/M2
LEFT VENTRICLE SYSTOLIC VOLUME: 29.73 ML
LEFT VENTRICULAR INTERNAL DIMENSION IN DIASTOLE: 4.16 CM (ref 3.5–6)
LEFT VENTRICULAR MASS: 126.78 G
LV LATERAL E/E' RATIO: 13.71 M/S
LV SEPTAL E/E' RATIO: 16 M/S
LVOT MG: 3.67 MMHG
LVOT MV: 0.9 CM/S
MV PEAK A VEL: 0.9 M/S
MV PEAK E VEL: 0.96 M/S
PISA TR MAX VEL: 2.84 M/S
PV MEAN GRADIENT: 1.42 MMHG
RA MAJOR: 3.79 CM
RA PRESSURE: 8 MMHG
RA WIDTH: 2.8 CM
STJ: 2.55 CM
TDI LATERAL: 0.07 M/S
TDI SEPTAL: 0.06 M/S
TDI: 0.07 M/S
TR MAX PG: 32 MMHG
TRICUSPID ANNULAR PLANE SYSTOLIC EXCURSION: 1.9 CM
TV REST PULMONARY ARTERY PRESSURE: 40 MMHG

## 2022-08-24 PROCEDURE — 25000003 PHARM REV CODE 250: Performed by: FAMILY MEDICINE

## 2022-08-24 PROCEDURE — 99152 MOD SED SAME PHYS/QHP 5/>YRS: CPT | Performed by: INTERNAL MEDICINE

## 2022-08-24 PROCEDURE — 36415 COLL VENOUS BLD VENIPUNCTURE: CPT | Performed by: STUDENT IN AN ORGANIZED HEALTH CARE EDUCATION/TRAINING PROGRAM

## 2022-08-24 PROCEDURE — 25000003 PHARM REV CODE 250: Performed by: INTERNAL MEDICINE

## 2022-08-24 PROCEDURE — 93458 PR CATH PLACE/CORON ANGIO, IMG SUPER/INTERP,W LEFT HEART VENTRICULOGRAPHY: ICD-10-PCS | Mod: 26,,, | Performed by: INTERNAL MEDICINE

## 2022-08-24 PROCEDURE — 93458 L HRT ARTERY/VENTRICLE ANGIO: CPT | Mod: 26,,, | Performed by: INTERNAL MEDICINE

## 2022-08-24 PROCEDURE — 99223 PR INITIAL HOSPITAL CARE,LEVL III: ICD-10-PCS | Mod: 25,,, | Performed by: INTERNAL MEDICINE

## 2022-08-24 PROCEDURE — 63600175 PHARM REV CODE 636 W HCPCS: Performed by: INTERNAL MEDICINE

## 2022-08-24 PROCEDURE — 93458 L HRT ARTERY/VENTRICLE ANGIO: CPT | Performed by: INTERNAL MEDICINE

## 2022-08-24 PROCEDURE — 25000003 PHARM REV CODE 250: Performed by: PHYSICIAN ASSISTANT

## 2022-08-24 PROCEDURE — 27201423 OPTIME MED/SURG SUP & DEVICES STERILE SUPPLY: Performed by: INTERNAL MEDICINE

## 2022-08-24 PROCEDURE — 21400001 HC TELEMETRY ROOM

## 2022-08-24 PROCEDURE — 99152 MOD SED SAME PHYS/QHP 5/>YRS: CPT | Mod: ,,, | Performed by: INTERNAL MEDICINE

## 2022-08-24 PROCEDURE — 99223 1ST HOSP IP/OBS HIGH 75: CPT | Mod: 25,,, | Performed by: INTERNAL MEDICINE

## 2022-08-24 PROCEDURE — 85730 THROMBOPLASTIN TIME PARTIAL: CPT | Performed by: STUDENT IN AN ORGANIZED HEALTH CARE EDUCATION/TRAINING PROGRAM

## 2022-08-24 PROCEDURE — 99152 PR MOD CONSCIOUS SEDATION, SAME PHYS, 5+ YRS, FIRST 15 MIN: ICD-10-PCS | Mod: ,,, | Performed by: INTERNAL MEDICINE

## 2022-08-24 PROCEDURE — C1894 INTRO/SHEATH, NON-LASER: HCPCS | Performed by: INTERNAL MEDICINE

## 2022-08-24 PROCEDURE — C1769 GUIDE WIRE: HCPCS | Performed by: INTERNAL MEDICINE

## 2022-08-24 RX ORDER — DIPHENHYDRAMINE HYDROCHLORIDE 50 MG/ML
INJECTION INTRAMUSCULAR; INTRAVENOUS
Status: DISCONTINUED | OUTPATIENT
Start: 2022-08-24 | End: 2022-08-24 | Stop reason: HOSPADM

## 2022-08-24 RX ORDER — FENTANYL CITRATE 50 UG/ML
INJECTION, SOLUTION INTRAMUSCULAR; INTRAVENOUS
Status: DISCONTINUED | OUTPATIENT
Start: 2022-08-24 | End: 2022-08-24 | Stop reason: HOSPADM

## 2022-08-24 RX ORDER — ACETAMINOPHEN 325 MG/1
650 TABLET ORAL EVERY 4 HOURS PRN
Status: DISCONTINUED | OUTPATIENT
Start: 2022-08-24 | End: 2022-08-25 | Stop reason: HOSPADM

## 2022-08-24 RX ORDER — ONDANSETRON 8 MG/1
8 TABLET, ORALLY DISINTEGRATING ORAL EVERY 8 HOURS PRN
Status: DISCONTINUED | OUTPATIENT
Start: 2022-08-24 | End: 2022-08-25 | Stop reason: HOSPADM

## 2022-08-24 RX ORDER — LIDOCAINE HYDROCHLORIDE 20 MG/ML
INJECTION, SOLUTION INFILTRATION; PERINEURAL
Status: DISCONTINUED | OUTPATIENT
Start: 2022-08-24 | End: 2022-08-24 | Stop reason: HOSPADM

## 2022-08-24 RX ORDER — MIDAZOLAM HYDROCHLORIDE 1 MG/ML
INJECTION, SOLUTION INTRAMUSCULAR; INTRAVENOUS
Status: DISCONTINUED | OUTPATIENT
Start: 2022-08-24 | End: 2022-08-24 | Stop reason: HOSPADM

## 2022-08-24 RX ORDER — SODIUM CHLORIDE 9 MG/ML
INJECTION, SOLUTION INTRAVENOUS CONTINUOUS
Status: DISCONTINUED | OUTPATIENT
Start: 2022-08-24 | End: 2022-08-24

## 2022-08-24 RX ORDER — HEPARIN SODIUM 1000 [USP'U]/ML
INJECTION, SOLUTION INTRAVENOUS; SUBCUTANEOUS
Status: DISCONTINUED | OUTPATIENT
Start: 2022-08-24 | End: 2022-08-24 | Stop reason: HOSPADM

## 2022-08-24 RX ADMIN — ATORVASTATIN CALCIUM 40 MG: 40 TABLET, FILM COATED ORAL at 09:08

## 2022-08-24 RX ADMIN — SODIUM CHLORIDE: 0.9 INJECTION, SOLUTION INTRAVENOUS at 11:08

## 2022-08-24 RX ADMIN — SODIUM CHLORIDE: 0.9 INJECTION, SOLUTION INTRAVENOUS at 03:08

## 2022-08-24 RX ADMIN — ASPIRIN 81 MG CHEWABLE TABLET 81 MG: 81 TABLET CHEWABLE at 09:08

## 2022-08-24 NOTE — PROGRESS NOTES
[facility] Medicine  Progress Note    Patient Name: Tania Figueroa  MRN: [id number]  Patient Class: IP- Inpatient   Admission Date: 8/23/2022  Length of Stay: 1 days  Attending Physician: Rola Godwin MD  Primary Care Provider: Yasmany Verdugo MD        Subjective:     Principal Problem:NSTEMI (non-ST elevated myocardial infarction)      HPI:  Patient is a 66 y/o  female with PMH of HTN and HLD was brought to ED in [place], [place] by EMS for evaluation of chest pain. She initially believed she was having an anaphylactic reaction to an insect bite, as she became diaphoretic, light-headed, and developed chest pain.  She was given nitroglycerin and aspirin EN route.  On arrival, patient was bradycardic with heart rate of 55.  Troponin 0.048 which trended up to 0.3.  EKG showed nonspecific T-wave abnormalities.  Cardiology was consulted via [facility] who recommended transfer for NSTEMI.  Patient was given 60 mg of Lovenox.  Currently at bedside she denies any chest pain or shortness of breath.          Overview/Hospital Course:  Admitted for NSTEMI, started on heparin, aspirin, statin. Troponin downtrending, cardiology consulted      Review of Systems   Constitutional:  Negative for fatigue and fever.   HENT:  Negative for sinus pressure.    Eyes:  Negative for visual disturbance.   Respiratory:  Negative for shortness of breath.    Cardiovascular:  Positive for chest pain (resolved).   Gastrointestinal:  Negative for nausea and vomiting.   Genitourinary:  Negative for difficulty urinating.   Musculoskeletal:  Negative for back pain.   Skin:  Negative for rash.   Neurological:  Negative for headaches.   Psychiatric/Behavioral:  Negative for confusion.    Objective:     Vital Signs (Most Recent):  Temp: 98.2 °F (36.8 °C) (08/24/22 0745)  Pulse: (!) 50 (08/24/22 0745)  Resp: 17 (08/24/22 0745)  BP: (!) 142/65 (08/24/22 0745)  SpO2: 100 % (08/24/22 0745)   Vital Signs (24h  Range):  Temp:  [97.6 °F (36.4 °C)-98.7 °F (37.1 °C)] 98.2 °F (36.8 °C)  Pulse:  [45-62] 50  Resp:  [15-20] 17  SpO2:  [96 %-100 %] 100 %  BP: (127-168)/(61-74) 142/65     Weight: 52.2 kg (115 lb)  Body mass index is 18.56 kg/m².  No intake or output data in the 24 hours ending 08/24/22 1019   Physical Exam  Constitutional:       General: She is not in acute distress.     Appearance: She is well-developed. She is not diaphoretic.   HENT:      Head: Normocephalic and atraumatic.   Eyes:      Pupils: Pupils are equal, round, and reactive to light.   Cardiovascular:      Rate and Rhythm: Normal rate and regular rhythm.      Heart sounds: Normal heart sounds. No murmur heard.    No friction rub. No gallop.   Pulmonary:      Effort: Pulmonary effort is normal. No respiratory distress.      Breath sounds: Normal breath sounds. No stridor. No wheezing or rales.   Abdominal:      General: Bowel sounds are normal. There is no distension.      Palpations: Abdomen is soft. There is no mass.      Tenderness: There is no abdominal tenderness. There is no guarding.   Musculoskeletal:      Right lower leg: No edema.      Left lower leg: No edema.   Skin:     General: Skin is warm.      Findings: No erythema.   Neurological:      Mental Status: She is alert and oriented to person, place, and time.       Significant Labs: All pertinent labs within the past 24 hours have been reviewed.    Significant Imaging: I have reviewed all pertinent imaging results/findings within the past 24 hours.      Assessment/Plan:      * NSTEMI (non-ST elevated myocardial infarction)  Chest pain  Troponin trending down  QUETA score: 4  Heparin started  Cardiology consulted, f/u recs  Echo   NPO at midnight     Advance Care Planning     Patient is dnr and sdm is her .           Essential hypertension  Hold BB due to bradycardia  Hydralazine PRN    Bradycardia  Hold BB      Tobacco abuse  Smoking cessation counseling      HLD  (hyperlipidemia)  Continue statin       VTE Risk Mitigation (From admission, onward)         Ordered     heparin 25,000 units in dextrose 5% (100 units/ml) IV bolus from bag - ADDITIONAL PRN BOLUS - 60 units/kg (max bolus 4000 units)  As needed (PRN)        Question:  Heparin Infusion Adjustment (DO NOT MODIFY ANSWER)  Answer:  \\[email or web address]\epic\Images\Pharmacy\HeparinInfusions\heparin LOW INTENSITY nomogram for [facility] [id number].pdf    08/23/22 1942     heparin 25,000 units in dextrose 5% (100 units/ml) IV bolus from bag - ADDITIONAL PRN BOLUS - 30 units/kg (max bolus 4000 units)  As needed (PRN)        Question:  Heparin Infusion Adjustment (DO NOT MODIFY ANSWER)  Answer:  \\[email or web address]\epic\Images\Pharmacy\HeparinInfusions\heparin LOW INTENSITY nomogram for [facility] [id number].pdf    08/23/22 1942     heparin 25,000 units in dextrose 5% 250 mL (100 units/mL) infusion LOW INTENSITY nomogram - [facility]  Continuous        Question Answer Comment   Heparin Infusion Adjustment (DO NOT MODIFY ANSWER) \\[email or web address]\epic\Images\Pharmacy\HeparinInfusions\heparin LOW INTENSITY nomogram for [facility] [id number].pdf    Begin at (in units/kg/hr) 12        08/23/22 1942     IP VTE LOW RISK PATIENT  Once         08/23/22 1941                Discharge Planning   KAMLESH:      Code Status: DNR   Is the patient medically ready for discharge?:     Reason for patient still in hospital (select all that apply): Patient trending condition, Treatment and Consult recommendations                     Rola Godwin MD  Department of Hospital Medicine   [facility] ([facility])

## 2022-08-24 NOTE — HPI
"Ms. Figueroa is a 67 year old female patient whose current medical conditions include HTN and hyerplipidemia who initially presented to [facility] ED in [place], [place] via EMS due to concerns of a possible anaphylactic reaction to an insect bite. Patient was sitting outside when she began to experience chest pain with associated lightheadedness and diaphoresis. En route, she received ASA and sublingual nitro. Initial workup revealed troponin of 0.048>0.3 and patient was subsequently transferred to [facility] for admission and further treatment. Cardiology consulted to assist with management. Patient seen and examined today, resting in bed. Feels ok. Still complains of mild chest heaviness and soreness, less intense than yesterday. Denies any prior history of MI or CAD. Previously had "normal cath" approximately 3 years ago with [facility] in [place]. States she recently stopped ASA and statin in anticipation of scheduled spinal surgery due to lumbar disease on 8/30/22. Chart reviewed. Troponin 0.048>-0.303>0.106>0.032. Echo pending. EKG showed sinus bradycardia, non-specific T wave abnormality.       "

## 2022-08-24 NOTE — PLAN OF CARE
POC reviewed with patient and she verbalized understanding. GILES this shift. Will continue with plan of care.     Problem: Adult Inpatient Plan of Care  Goal: Plan of Care Review  Outcome: Ongoing, Progressing  Goal: Patient-Specific Goal (Individualized)  Outcome: Ongoing, Progressing  Goal: Absence of Hospital-Acquired Illness or Injury  Outcome: Ongoing, Progressing  Goal: Optimal Comfort and Wellbeing  Outcome: Ongoing, Progressing  Goal: Readiness for Transition of Care  Outcome: Ongoing, Progressing     Problem: Fall Injury Risk  Goal: Absence of Fall and Fall-Related Injury  Outcome: Ongoing, Progressing

## 2022-08-24 NOTE — ASSESSMENT & PLAN NOTE
Chest pain  Troponin trending down  QUETA score: 4  Heparin started  Cardiology consulted  Echo   NPO at midnight     Advance Care Planning     Patient is dnr and sdm is her .

## 2022-08-24 NOTE — BRIEF OP NOTE
[facility] - Cath Lab ([facility])  Brief Operative Note  Cardiology    SUMMARY     Surgery Date: 8/24/2022     Surgeon(s) and Role:     * Fabiola Ragsdale MD - Primary    Assisting Surgeon: None    Pre-op Diagnosis:  NSTEMI (non-ST elevated myocardial infarction) [I21.4]Bradycardia [R00.1]    Post-op Diagnosis: Post-Op Diagnosis Codes:     * NSTEMI (non-ST elevated myocardial infarction) [I21.4]     * Bradycardia [R00.1]    Procedure Performed:     Procedure(s) (LRB):  CATHETERIZATION, HEART, LEFT (Left)    Technical Procedures Used:   Normal cors  Normal EF on ventriculogram   No gradient   EDP 14   OK to discharge home from cardiac standpoint     Estimated Blood Loss: * No values recorded between 8/24/2022 12:38 PM and 8/24/2022 12:57 PM *         Specimens:   Specimen (24h ago, onward)            None

## 2022-08-24 NOTE — PLAN OF CARE
Problem: Adult Inpatient Plan of Care  Goal: Plan of Care Review  Outcome: Ongoing, Progressing  Goal: Patient-Specific Goal (Individualized)  Outcome: Ongoing, Progressing  Goal: Absence of Hospital-Acquired Illness or Injury  Outcome: Ongoing, Progressing  Goal: Optimal Comfort and Wellbeing  Outcome: Ongoing, Progressing  Goal: Readiness for Transition of Care  Outcome: Ongoing, Progressing     Problem: Fall Injury Risk  Goal: Absence of Fall and Fall-Related Injury  Outcome: Ongoing, Progressing     Patient A+Ox4. Patient pain and safety monitored q1-2hrs this shift. Turned and repositioned independently. No skin issues noted. Bed locked and in lowest position. Side rails up x2. Bedrest maintained this shift. Pt sleeps with 2L NC at night.  Heparin gtt currently infusing at 12 units/kg. Pt otherwise stable. Will continue to monitor.

## 2022-08-24 NOTE — HPI
Patient is a 68 y/o  female with PMH of HTN and HLD was brought to ED in [place], [place] by EMS for evaluation of chest pain. She initially believed she was having an anaphylactic reaction to an insect bite, as she became diaphoretic, light-headed, and developed chest pain.  She was given nitroglycerin and aspirin EN route.  On arrival, patient was bradycardic with heart rate of 55.  Troponin 0.048 which trended up to 0.3.  EKG showed nonspecific T-wave abnormalities.  Cardiology was consulted via [facility] who recommended transfer for NSTEMI.  Patient was given 60 mg of Lovenox.  Currently at bedside she denies any chest pain or shortness of breath.

## 2022-08-24 NOTE — PLAN OF CARE
[facility] - Telemetry (Hospital)  Initial Discharge Assessment       Primary Care Provider: Yasmany Verdugo MD    Admission Diagnosis: NSTEMI (non-ST elevated myocardial infarction) [I21.4]    Admission Date: 8/23/2022  Expected Discharge Date:     Discharge Barriers Identified: None    Payor: [employer] MANAGED MEDICARE / Plan: [employer] MEDICARE HMO / Product Type: Capitation /     Extended Emergency Contact Information  Primary Emergency Contact: Tito Cunningham  Mobile Phone: [phone]  Relation: Significant other    Discharge Plan A: Home with family  Discharge Plan B: Home Health    No Pharmacies Listed    Initial Assessment (most recent)     Adult Discharge Assessment - 08/24/22 1211        Discharge Assessment    Assessment Type Discharge Planning Assessment     Confirmed/corrected address, phone number and insurance Yes     Confirmed Demographics --   CM updated in demographics    Source of Information patient     Communicated KAMLESH with patient/caregiver Date not available/Unable to determine     Reason For Admission NSTEMI     Lives With significant other     Facility Arrived From: [facility]     Do you expect to return to your current living situation? Yes     Do you have help at home or someone to help you manage your care at home? Yes     Who are your caregiver(s) and their phone number(s)? Tito Cunningham, significant other     Prior to hospitilization cognitive status: Alert/Oriented     Current cognitive status: Alert/Oriented     Walking or Climbing Stairs Difficulty ambulation difficulty, requires equipment     Mobility Management uses rollator to ambulate     Dressing/Bathing Difficulty none     Home Accessibility stairs to enter home     Number of Stairs, Main Entrance five     Home Layout Able to live on 1st floor     Equipment Currently Used at Home rollator;oxygen     Readmission within 30 days? No     Patient currently being followed by outpatient case management? No     Do you currently have service(s)  that help you manage your care at home? No     Do you take prescription medications? Yes     Do you have prescription coverage? Yes     Coverage [employer]     Do you have any problems affording any of your prescribed medications? No     Is the patient taking medications as prescribed? yes     Who is going to help you get home at discharge? Tito Cunningham, significant other     How do you get to doctors appointments? family or friend will provide;car, drives self     Are you on dialysis? No     Do you take coumadin? No     Discharge Plan A Home with family     Discharge Plan B Home Health     DME Needed Upon Discharge  none     Discharge Plan discussed with: Patient;Spouse/sig other     Name(s) and Number(s) Tito Cunningham, significant other     Discharge Barriers Identified None        Relationship/Environment    Name(s) of Who Lives With Patient Tito Cunningham, significant other               Met with patient and significant other, Tito. Patient is independent with adls with assistive equipment.  She lives with her significant other, Tito who can be her help at home.  Currently no discharge needs identified.      Updated white board with 's name and number. Transitional Care Folder, Discharge Planning Begins on Admission pamphlet, [facility] Bedside Delivery pamphlet, Advance Directive information given to patient along with the contact information given.Instructed patient or family to call with any questions or concerns.

## 2022-08-24 NOTE — ASSESSMENT & PLAN NOTE
-Presents with chest pain symptoms along with elevated troponin  -Continue ASA, statin  -Echo pending  -LHC today by Dr. Ragsdale. All risks, benefits, and treatment alternatives explained to patient in detail. All questions answered. She has agreed to proceed.

## 2022-08-24 NOTE — H&P
[facility] Medicine  History & Physical    Patient Name: Tania Figueroa  MRN: [id number]  Patient Class: IP- Inpatient  Admission Date: 8/23/2022  Attending Physician: Asa Anderson MD  Primary Care Provider: Yasmany Verdugo MD          Patient information was obtained from patient and ER records.     Subjective:     Principal Problem:NSTEMI (non-ST elevated myocardial infarction)    Chief Complaint: No chief complaint on file.       HPI: Patient is a 68 y/o  female with PMH of HTN and HLD was brought to ED in [place], [place] by EMS for evaluation of chest pain. She initially believed she was having an anaphylactic reaction to an insect bite, as she became diaphoretic, light-headed, and developed chest pain.  She was given nitroglycerin and aspirin EN route.  On arrival, patient was bradycardic with heart rate of 55.  Troponin 0.048 which trended up to 0.3.  EKG showed nonspecific T-wave abnormalities.  Cardiology was consulted via [facility] who recommended transfer for NSTEMI.  Patient was given 60 mg of Lovenox.  Currently at bedside she denies any chest pain or shortness of breath.            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     Hypercholesterolemia     Hypertension     Lumbar disc disease        Past Surgical History:   Procedure Laterality Date    APPENDECTOMY      CHOLECYSTECTOMY      OOPHORECTOMY      TONSILLECTOMY         Review of patient's allergies indicates:   Allergen Reactions    Codeine     Penicillins     Propoxyphene     Propoxyphene-asa-caffeine        Current Facility-Administered Medications on File Prior to Encounter   Medication    [COMPLETED] aspirin tablet 325 mg    [COMPLETED] atorvastatin tablet 80 mg    [COMPLETED] enoxaparin injection 60 mg    [DISCONTINUED] enoxaparin injection 60 mg     Current Outpatient Medications on File Prior to Encounter   Medication Sig    albuterol (PROVENTIL/VENTOLIN  HFA) 90 mcg/actuation inhaler Inhale 2 puffs into the lungs every 4 (four) hours as needed.    albuterol-ipratropium (DUO-NEB) 2.5 mg-0.5 mg/3 mL nebulizer solution ipratropium 0.5 mg-albuterol 3 mg (2.5 mg base)/3 mL nebulization soln    ascorbic acid, vitamin C, (VITAMIN C) 500 MG tablet Take 500 mg by mouth.    aspirin 81 MG Chew Take 81 mg by mouth.    atorvastatin (LIPITOR) 40 MG tablet atorvastatin 40 mg tablet    azithromycin (Z-DENILSON) 250 MG tablet Zithromax Z-Denilson 250 mg tablet   TAKE 2 TABLETS (500 MG) BY ORAL ROUTE ONCE DAILY FOR 1 DAY THEN 1 TABLET (250 MG) BY ORAL ROUTE ONCE DAILY FOR 4 DAYS    busPIRone (BUSPAR) 15 MG tablet Take 15 mg by mouth.    carvediloL (COREG) 3.125 MG tablet carvedilol 3.125 mg tablet    coenzyme Q10 10 mg capsule Take 10 mg by mouth.    denosumab (PROLIA) 60 mg/mL Syrg Inject 60 mg into the skin.    lisinopriL (PRINIVIL,ZESTRIL) 30 MG tablet Take 30 mg by mouth.    loratadine (CLARITIN) 10 mg tablet Take 10 mg by mouth daily as needed.    losartan (COZAAR) 25 MG tablet losartan 25 mg tablet    meclizine (ANTIVERT) 25 MG tablet Take 25 mg by mouth daily as needed.    melatonin 10 mg Tab Take by mouth.    polyethylene glycol (GLYCOLAX) 17 gram/dose powder polyethylene glycol 3350 17 gram/dose oral powder    potassium citrate 99 mg Cap Take by mouth.    rosuvastatin (CRESTOR) 20 MG tablet rosuvastatin 20 mg tablet     Family History       Problem Relation (Age of Onset)    Diabetes Mother    Heart disease Mother, Father    Hypertension Mother    Sarcoidosis Mother    Stroke Father          Tobacco Use    Smoking status: Current Every Day Smoker     Packs/day: 1.00     Types: Cigarettes    Smokeless tobacco: Never Used   Substance and Sexual Activity    Alcohol use: Never    Drug use: Never    Sexual activity: Not on file     Review of Systems   Constitutional:  Negative for fatigue and fever.   HENT:  Negative for sinus pressure.    Eyes:  Negative for visual  disturbance.   Respiratory:  Negative for shortness of breath.    Cardiovascular:  Positive for chest pain (resolved).   Gastrointestinal:  Negative for nausea and vomiting.   Genitourinary:  Negative for difficulty urinating.   Musculoskeletal:  Negative for back pain.   Skin:  Negative for rash.   Neurological:  Negative for headaches.   Psychiatric/Behavioral:  Negative for confusion.    Objective:     Vital Signs (Most Recent):  Temp: 98.7 °F (37.1 °C) (08/23/22 1947)  Pulse: (!) 52 (08/23/22 1947)  Resp: 20 (08/23/22 1947)  BP: (!) 168/73 (08/23/22 1947)  SpO2: 96 % (08/23/22 1947)   Vital Signs (24h Range):  Temp:  [97.6 °F (36.4 °C)-98.7 °F (37.1 °C)] 98.7 °F (37.1 °C)  Pulse:  [44-56] 52  Resp:  [14-21] 20  SpO2:  [96 %-100 %] 96 %  BP: ()/(48-77) 168/73        There is no height or weight on file to calculate BMI.    Physical Exam  Constitutional:       General: She is not in acute distress.     Appearance: She is well-developed. She is not diaphoretic.   HENT:      Head: Normocephalic and atraumatic.   Eyes:      Pupils: Pupils are equal, round, and reactive to light.   Cardiovascular:      Rate and Rhythm: Normal rate and regular rhythm.      Heart sounds: Normal heart sounds. No murmur heard.    No friction rub. No gallop.   Pulmonary:      Effort: Pulmonary effort is normal. No respiratory distress.      Breath sounds: Normal breath sounds. No stridor. No wheezing or rales.   Abdominal:      General: Bowel sounds are normal. There is no distension.      Palpations: Abdomen is soft. There is no mass.      Tenderness: There is no abdominal tenderness. There is no guarding.   Musculoskeletal:      Right lower leg: No edema.      Left lower leg: No edema.   Skin:     General: Skin is warm.      Findings: No erythema.   Neurological:      Mental Status: She is alert and oriented to person, place, and time.         CRANIAL NERVES     CN III, IV, VI   Pupils are equal, round, and reactive to light.      Significant Labs:   Results for orders placed or performed during the hospital encounter of 08/22/22   Comprehensive metabolic panel   Result Value Ref Range    Sodium Level 143 136 - 145 mmol/L    Potassium Level 4.0 3.5 - 5.1 mmol/L    Chloride 105 98 - 107 mmol/L    Carbon Dioxide 29 23 - 31 mmol/L    Glucose Level 145 (H) 82 - 115 mg/dL    Blood Urea Nitrogen 12.0 9.8 - 20.1 mg/dL    Creatinine 0.67 0.55 - 1.02 mg/dL    Calcium Level Total 9.8 8.4 - 10.2 mg/dL    Protein Total 6.5 5.8 - 7.6 gm/dL    Albumin Level 3.5 3.4 - 4.8 gm/dL    Globulin 3.0 2.4 - 3.5 gm/dL    Albumin/Globulin Ratio 1.2 1.1 - 2.0 ratio    Bilirubin Total 0.4 <=1.5 mg/dL    Alkaline Phosphatase 60 40 - 150 unit/L    Alanine Aminotransferase 20 0 - 55 unit/L    Aspartate Aminotransferase 19 5 - 34 unit/L    eGFR >60 mls/min/1.73/m2   Troponin I #1   Result Value Ref Range    Troponin-I 0.048 (H) 0.000 - 0.045 ng/mL   BNP   Result Value Ref Range    Natriuretic Peptide 43.6 <=100.0 pg/mL   CBC with Differential   Result Value Ref Range    WBC 15.5 (H) 4.5 - 11.5 x10(3)/mcL    RBC 4.08 (L) 4.20 - 5.40 x10(6)/mcL    Hgb 11.5 (L) 12.0 - 16.0 gm/dL    Hct 36.8 (L) 37.0 - 47.0 %    MCV 90.2 80.0 - 94.0 fL    MCH 28.2 27.0 - 31.0 pg    MCHC 31.3 (L) 33.0 - 36.0 mg/dL    RDW 15.0 11.5 - 17.0 %    Platelet 229 130 - 400 x10(3)/mcL    MPV 10.3 7.4 - 10.4 fL    Neut % 78.2 %    Lymph % 16.6 %    Mono % 3.6 %    Eos % 1.2 %    Basophil % 0.1 %    Lymph # 2.57 0.6 - 4.6 x10(3)/mcL    Neut # 12.2 (H) 2.1 - 9.2 x10(3)/mcL    Mono # 0.56 0.1 - 1.3 x10(3)/mcL    Eos # 0.18 0 - 0.9 x10(3)/mcL    Baso # 0.01 0 - 0.2 x10(3)/mcL    IG# 0.05 (H) 0 - 0.04 x10(3)/mcL    IG% 0.3 %   Troponin I   Result Value Ref Range    Troponin-I 0.303 (H) 0.000 - 0.045 ng/mL   Troponin I   Result Value Ref Range    Troponin-I 0.106 (H) 0.000 - 0.045 ng/mL         Significant Imaging: X-Ray Chest AP Portable  Narrative: EXAMINATION:  XR CHEST AP PORTABLE    CLINICAL  HISTORY:  Chest Pain;, .    COMPARISON:  08/19/2022    FINDINGS:  An AP view or more reveals the heart to be normal in size.  The trachea is midline.  Atherosclerosis is seen within the aorta.  Hazy interstitial opacities are scattered throughout the lungs bilaterally.  Degenerative changes and curvature are noted to the thoracic spine.  Bony structures are osteopenic.  Impression: 1. No active cardiopulmonary disease identified    Electronically signed by: Isaac Georges  Date:    08/22/2022  Time:    16:55      Assessment/Plan:     * NSTEMI (non-ST elevated myocardial infarction)  Chest pain  Troponin trending down  QUETA score: 4  Heparin started  Cardiology consulted  Echo   NPO at midnight     Advance Care Planning     Patient is dnr and sdm is her .           HLD (hyperlipidemia)  Continue statin       Essential hypertension  Hold BB due to bradycardia  Hydralazine PRN      VTE Risk Mitigation (From admission, onward)         Ordered     heparin 25,000 units in dextrose 5% (100 units/ml) IV bolus from bag - ADDITIONAL PRN BOLUS - 60 units/kg (max bolus 4000 units)  As needed (PRN)        Question:  Heparin Infusion Adjustment (DO NOT MODIFY ANSWER)  Answer:  [email or web address]\Images\Pharmacy\HeparinInfusions\heparin LOW INTENSITY nomogram for [facility] [id number].pdf    08/23/22 1942     heparin 25,000 units in dextrose 5% (100 units/ml) IV bolus from bag - ADDITIONAL PRN BOLUS - 30 units/kg (max bolus 4000 units)  As needed (PRN)        Question:  Heparin Infusion Adjustment (DO NOT MODIFY ANSWER)  Answer:  [email or web address]\Images\Pharmacy\HeparinInfusions\heparin LOW INTENSITY nomogram for [facility] [id number].pdf    08/23/22 1942     heparin 25,000 units in dextrose 5% (100 units/ml) IV bolus from bag INITIAL BOLUS (max bolus 4000 units)  Once        Question:  Heparin Infusion Adjustment (DO NOT MODIFY ANSWER)  Answer:  [email or web address]\Images\Pharmacy\HeparinInfusions\heparin LOW INTENSITY nomogram for [facility]  [id number]    08/23/22 1942     heparin 25,000 units in dextrose 5% 250 mL (100 units/mL) infusion LOW INTENSITY nomogram - [facility]  Continuous        Question Answer Comment   Heparin Infusion Adjustment (DO NOT MODIFY ANSWER) [email or web address]\epic\Images\Pharmacy\HeparinInfusions\heparin LOW INTENSITY nomogram for [facility] [id number]    Begin at (in units/kg/hr) 12        08/23/22 1942     IP VTE LOW RISK PATIENT  Once         08/23/22 1941                   Asa Anderson MD  Department of Hospital Medicine   [facility] ([facility]

## 2022-08-24 NOTE — CONSULTS
"[facility] - Tele[facility] (Hospital)  Cardiology  Consult Note    Patient Name: Tania Figueroa  MRN: [id number]  Admission Date: 8/23/2022  Hospital Length of Stay: 1 days  Code Status: DNR   Attending Provider: Rola Godwin MD   Consulting Provider: Araseli Figueroa PA-C  Primary Care Physician: Yasmany Verdugo MD  Principal Problem:NSTEMI (non-ST elevated myocardial infarction)    Patient information was obtained from patient, past medical records and ER records.     Inpatient consult to Cardiology  Consult performed by: Araseli Figueroa PA-C  Consult ordered by: Asa Anderson MD        Subjective:     Chief Complaint:  NSTEMI     HPI:   Ms. Figueroa is a 67 year old female patient whose current medical conditions include HTN and hyerplipidemia who initially presented to [facility] ED in [place], [place] via EMS due to concerns of a possible anaphylactic reaction to an insect bite. Patient was sitting outside when she began to experience chest pain with associated lightheadedness and diaphoresis. En route, she received ASA and sublingual nitro. Initial workup revealed troponin of 0.048>0.3 and patient was subsequently transferred to [facility] for admission and further treatment. Cardiology consulted to assist with management. Patient seen and examined today, resting in bed. Feels ok. Still complains of mild chest heaviness and soreness, less intense than yesterday. Denies any prior history of MI or CAD. Previously had "normal cath" approximately 3 years ago with [facility] in [place]. States she recently stopped ASA and statin in anticipation of scheduled spinal surgery due to lumbar disease on 8/30/22. Chart reviewed. Troponin 0.048>-0.303>0.106>0.032. Echo pending. EKG showed sinus bradycardia, non-specific T wave abnormality.           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     Hypercholesterolemia     Hypertension     Lumbar disc disease        Past Surgical History:   Procedure " Laterality Date    APPENDECTOMY      CHOLECYSTECTOMY      OOPHORECTOMY      TONSILLECTOMY         Review of patient's allergies indicates:   Allergen Reactions    Codeine     Penicillins     Propoxyphene     Propoxyphene-asa-caffeine        Current Facility-Administered Medications on File Prior to Encounter   Medication    [DISCONTINUED] enoxaparin injection 60 mg     Current Outpatient Medications on File Prior to Encounter   Medication Sig    albuterol (PROVENTIL/VENTOLIN HFA) 90 mcg/actuation inhaler Inhale 2 puffs into the lungs every 4 (four) hours as needed.    albuterol-ipratropium (DUO-NEB) 2.5 mg-0.5 mg/3 mL nebulizer solution ipratropium 0.5 mg-albuterol 3 mg (2.5 mg base)/3 mL nebulization soln    ascorbic acid, vitamin C, (VITAMIN C) 500 MG tablet Take 500 mg by mouth.    aspirin 81 MG Chew Take 81 mg by mouth.    atorvastatin (LIPITOR) 40 MG tablet atorvastatin 40 mg tablet    azithromycin (Z-DENILSON) 250 MG tablet Zithromax Z-Denilson 250 mg tablet   TAKE 2 TABLETS (500 MG) BY ORAL ROUTE ONCE DAILY FOR 1 DAY THEN 1 TABLET (250 MG) BY ORAL ROUTE ONCE DAILY FOR 4 DAYS    busPIRone (BUSPAR) 15 MG tablet Take 15 mg by mouth.    carvediloL (COREG) 3.125 MG tablet carvedilol 3.125 mg tablet    coenzyme Q10 10 mg capsule Take 10 mg by mouth.    denosumab (PROLIA) 60 mg/mL Syrg Inject 60 mg into the skin.    lisinopriL (PRINIVIL,ZESTRIL) 30 MG tablet Take 30 mg by mouth.    loratadine (CLARITIN) 10 mg tablet Take 10 mg by mouth daily as needed.    losartan (COZAAR) 25 MG tablet losartan 25 mg tablet    meclizine (ANTIVERT) 25 MG tablet Take 25 mg by mouth daily as needed.    melatonin 10 mg Tab Take by mouth.    polyethylene glycol (GLYCOLAX) 17 gram/dose powder polyethylene glycol 3350 17 gram/dose oral powder    potassium citrate 99 mg Cap Take by mouth.    rosuvastatin (CRESTOR) 20 MG tablet rosuvastatin 20 mg tablet     Family History       Problem Relation (Age of Onset)    Diabetes  Mother    Heart disease Mother, Father    Hypertension Mother    Sarcoidosis Mother    Stroke Father          Tobacco Use    Smoking status: Current Every Day Smoker     Packs/day: 1.00     Types: Cigarettes    Smokeless tobacco: Never Used   Substance and Sexual Activity    Alcohol use: Never    Drug use: Never    Sexual activity: Not on file     Review of Systems   Constitutional: Positive for diaphoresis.   HENT: Negative.     Eyes: Negative.    Cardiovascular:  Positive for chest pain.   Respiratory: Negative.     Endocrine: Negative.    Hematologic/Lymphatic: Negative.    Skin: Negative.    Musculoskeletal: Negative.    Gastrointestinal: Negative.    Genitourinary: Negative.    Neurological:  Positive for light-headedness.   Psychiatric/Behavioral: Negative.     Allergic/Immunologic: Negative.    Objective:     Vital Signs (Most Recent):  Temp: 98.2 °F (36.8 °C) (08/24/22 0745)  Pulse: (!) 50 (08/24/22 0745)  Resp: 17 (08/24/22 0745)  BP: (!) 142/65 (08/24/22 0745)  SpO2: 100 % (08/24/22 0745)   Vital Signs (24h Range):  Temp:  [97.6 °F (36.4 °C)-98.7 °F (37.1 °C)] 98.2 °F (36.8 °C)  Pulse:  [45-62] 50  Resp:  [15-20] 17  SpO2:  [96 %-100 %] 100 %  BP: (119-168)/(61-77) 142/65     Weight: 52.2 kg (115 lb)  Body mass index is 18.56 kg/m².    SpO2: 100 %  O2 Device (Oxygen Therapy): room air    No intake or output data in the 24 hours ending 08/24/22 0927    Lines/Drains/Airways       Peripheral Intravenous Line  Duration                  Peripheral IV - Single Lumen 08/23/22 2145 20 G Left Antecubital <1 day                    Physical Exam  Vitals and nursing note reviewed.   Constitutional:       General: She is not in acute distress.     Appearance: Normal appearance. She is well-developed. She is not diaphoretic.   HENT:      Head: Normocephalic and atraumatic.   Eyes:      General:         Right eye: No discharge.         Left eye: No discharge.      Pupils: Pupils are equal, round, and reactive to  light.   Neck:      Thyroid: No thyromegaly.      Vascular: No JVD.      Trachea: No tracheal deviation.   Cardiovascular:      Rate and Rhythm: Normal rate and regular rhythm.      Heart sounds: Normal heart sounds, S1 normal and S2 normal. No murmur heard.  Pulmonary:      Effort: Pulmonary effort is normal. No respiratory distress.      Breath sounds: Normal breath sounds. No wheezing or rales.   Abdominal:      General: There is no distension.      Tenderness: There is no rebound.   Musculoskeletal:      Cervical back: Neck supple.      Right lower leg: No edema.      Left lower leg: No edema.   Skin:     General: Skin is warm and dry.      Findings: No erythema.   Neurological:      General: No focal deficit present.      Mental Status: She is alert and oriented to person, place, and time.   Psychiatric:         Mood and Affect: Mood normal.         Behavior: Behavior normal.         Thought Content: Thought content normal.       Significant Labs: CMP   Recent Labs   Lab 08/22/22  1624 08/23/22 2018    142   K 4.0 3.7   CL  --  106   CO2 29 27   GLU  --  86   BUN 12.0 11   CREATININE 0.67 0.7   CALCIUM 9.8 9.4   ALBUMIN 3.5  --    BILITOT 0.4  --    ALKPHOS 60  --    AST 19  --    ALT 20  --    ANIONGAP  --  9   , CBC   Recent Labs   Lab 08/22/22  1624 08/23/22 2018   WBC 15.5* 11.16   HGB 11.5* 10.8*   HCT 36.8* 35.6*    226   , Troponin   Recent Labs   Lab 08/22/22  1934 08/23/22  0615 08/23/22  2018   TROPONINI 0.303* 0.106* 0.032*   , and All pertinent lab results from the last 24 hours have been reviewed.    Significant Imaging: Echocardiogram: Transthoracic echo (TTE) complete ([facility] Only):   Results for orders placed or performed during the hospital encounter of 08/23/22   Echo   Result Value Ref Range    BSA 1.56 m2    TDI SEPTAL 0.06 m/s    LV LATERAL E/E' RATIO 13.71 m/s    LV SEPTAL E/E' RATIO 16.00 m/s    LA WIDTH 3.40 cm    IVC diameter 2.04 cm    Left Ventricular Outflow Tract Mean  Velocity 0.90 cm/s    Left Ventricular Outflow Tract Mean Gradient 3.67 mmHg    TDI LATERAL 0.07 m/s    LVIDd 4.16 3.5 - 6.0 cm    IVS 0.94 0.6 - 1.1 cm    Posterior Wall 0.97 0.6 - 1.1 cm    Ao root annulus 2.86 cm    LVIDs 2.81 2.1 - 4.0 cm    FS 32 28 - 44 %    LA volume 39.18 cm3    STJ 2.55 cm    Ascending aorta 2.53 cm    LV mass 126.78 g    LA size 3.10 cm    TAPSE 1.90 cm    Left Ventricle Relative Wall Thickness 0.47 cm    AV mean gradient 5 mmHg    AV valve area 1.84 cm2    AV Velocity Ratio 0.81     AV index (prosthetic) 0.78     PV peak gradient 2.39 mmHg    E/A ratio 1.07     Mean e' 0.07 m/s    E wave deceleration time 251.21 msec    IVRT 72.31 msec    LVOT diameter 1.73 cm    LVOT area 2.3 cm2    LVOT peak madhu 1.40 m/s    LVOT peak VTI 31.40 cm    Ao peak madhu 1.72 m/s    Ao VTI 40.1 cm    RVOT peak madhu 0.77 m/s    RVOT peak VTI 21.5 cm    LVOT stroke volume 73.77 cm3    AV peak gradient 12 mmHg    PV mean gradient 1.42 mmHg    E/E' ratio 14.77 m/s    MV Peak E Madhu 0.96 m/s    TR Max Madhu 2.84 m/s    MV Peak A Madhu 0.90 m/s    LV Systolic Volume 29.73 mL    LV Systolic Volume Index 18.8 mL/m2    LV Diastolic Volume 76.85 mL    LV Diastolic Volume Index 48.64 mL/m2    LA Volume Index 24.8 mL/m2    LV Mass Index 80 g/m2    RA Major Axis 3.79 cm    Left Atrium Minor Axis 4.47 cm    Left Atrium Major Axis 4.28 cm    Triscuspid Valve Regurgitation Peak Gradient 32 mmHg    RA Width 2.80 cm   , EKG: Reviewed, and X-Ray: CXR: X-Ray Chest 1 View (CXR): No results found for this visit on 08/23/22. and X-Ray Chest PA and Lateral (CXR): No results found for this visit on 08/23/22.    Assessment and Plan:   Patient who presents with chest pain/NSTEMI. Stable this AM. Echo ordered. Continue OMT. [facility] today, further rec's to follow.     * NSTEMI (non-ST elevated myocardial infarction)  -Presents with chest pain symptoms along with elevated troponin  -Continue ASA, statin  -Echo pending  -[facility] today by Dr. Ragsdale. All  risks, benefits, and treatment alternatives explained to patient in detail. All questions answered. She has agreed to proceed.    Bradycardia  -BB held    Tobacco abuse  -Cessation advised    HLD (hyperlipidemia)  -Continue statin        VTE Risk Mitigation (From admission, onward)         Ordered     heparin 25,000 units in dextrose 5% (100 units/ml) IV bolus from bag - ADDITIONAL PRN BOLUS - 60 units/kg (max bolus 4000 units)  As needed (PRN)        Question:  Heparin Infusion Adjustment (DO NOT MODIFY ANSWER)  Answer:  [email or web address]\epic\Images\Pharmacy\HeparinInfusions\heparin LOW INTENSITY nomogram for [facility] [id number].pdf    08/23/22 1942     heparin 25,000 units in dextrose 5% (100 units/ml) IV bolus from bag - ADDITIONAL PRN BOLUS - 30 units/kg (max bolus 4000 units)  As needed (PRN)        Question:  Heparin Infusion Adjustment (DO NOT MODIFY ANSWER)  Answer:  [email or web address]\Images\Pharmacy\HeparinInfusions\heparin LOW INTENSITY nomogram for [facility] [id number].pdf    08/23/22 1942     heparin 25,000 units in dextrose 5% 250 mL (100 units/mL) infusion LOW INTENSITY nomogram - [facility]  Continuous        Question Answer Comment   Heparin Infusion Adjustment (DO NOT MODIFY ANSWER) [email or web address]\Images\Pharmacy\HeparinInfusions\heparin LOW INTENSITY nomogram for [facility] [id number]    Begin at (in units/kg/hr) 12        08/23/22 1942     IP VTE LOW RISK PATIENT  Once         08/23/22 1941                Thank you for your consult. I will follow-up with patient. Please contact us if you have any additional questions.    Araseli Figueroa PA-C  Cardiology   [facility] ([facility])

## 2022-08-24 NOTE — SUBJECTIVE & OBJECTIVE
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     Hypercholesterolemia     Hypertension     Lumbar disc disease        Past Surgical History:   Procedure Laterality Date    APPENDECTOMY      CHOLECYSTECTOMY      OOPHORECTOMY      TONSILLECTOMY         Review of patient's allergies indicates:   Allergen Reactions    Codeine     Penicillins     Propoxyphene     Propoxyphene-asa-caffeine        Current Facility-Administered Medications on File Prior to Encounter   Medication    [DISCONTINUED] enoxaparin injection 60 mg     Current Outpatient Medications on File Prior to Encounter   Medication Sig    albuterol (PROVENTIL/VENTOLIN HFA) 90 mcg/actuation inhaler Inhale 2 puffs into the lungs every 4 (four) hours as needed.    albuterol-ipratropium (DUO-NEB) 2.5 mg-0.5 mg/3 mL nebulizer solution ipratropium 0.5 mg-albuterol 3 mg (2.5 mg base)/3 mL nebulization soln    ascorbic acid, vitamin C, (VITAMIN C) 500 MG tablet Take 500 mg by mouth.    aspirin 81 MG Chew Take 81 mg by mouth.    atorvastatin (LIPITOR) 40 MG tablet atorvastatin 40 mg tablet    azithromycin (Z-DENILSON) 250 MG tablet Zithromax Z-Denilson 250 mg tablet   TAKE 2 TABLETS (500 MG) BY ORAL ROUTE ONCE DAILY FOR 1 DAY THEN 1 TABLET (250 MG) BY ORAL ROUTE ONCE DAILY FOR 4 DAYS    busPIRone (BUSPAR) 15 MG tablet Take 15 mg by mouth.    carvediloL (COREG) 3.125 MG tablet carvedilol 3.125 mg tablet    coenzyme Q10 10 mg capsule Take 10 mg by mouth.    denosumab (PROLIA) 60 mg/mL Syrg Inject 60 mg into the skin.    lisinopriL (PRINIVIL,ZESTRIL) 30 MG tablet Take 30 mg by mouth.    loratadine (CLARITIN) 10 mg tablet Take 10 mg by mouth daily as needed.    losartan (COZAAR) 25 MG tablet losartan 25 mg tablet    meclizine (ANTIVERT) 25 MG tablet Take 25 mg by mouth daily as needed.    melatonin 10 mg Tab Take by mouth.    polyethylene glycol (GLYCOLAX) 17 gram/dose powder polyethylene glycol 3350 17 gram/dose oral powder    potassium citrate 99 mg  Cap Take by mouth.    rosuvastatin (CRESTOR) 20 MG tablet rosuvastatin 20 mg tablet     Family History       Problem Relation (Age of Onset)    Diabetes Mother    Heart disease Mother, Father    Hypertension Mother    Sarcoidosis Mother    Stroke Father          Tobacco Use    Smoking status: Current Every Day Smoker     Packs/day: 1.00     Types: Cigarettes    Smokeless tobacco: Never Used   Substance and Sexual Activity    Alcohol use: Never    Drug use: Never    Sexual activity: Not on file     Review of Systems   Constitutional: Positive for diaphoresis.   HENT: Negative.     Eyes: Negative.    Cardiovascular:  Positive for chest pain.   Respiratory: Negative.     Endocrine: Negative.    Hematologic/Lymphatic: Negative.    Skin: Negative.    Musculoskeletal: Negative.    Gastrointestinal: Negative.    Genitourinary: Negative.    Neurological:  Positive for light-headedness.   Psychiatric/Behavioral: Negative.     Allergic/Immunologic: Negative.    Objective:     Vital Signs (Most Recent):  Temp: 98.2 °F (36.8 °C) (08/24/22 0745)  Pulse: (!) 50 (08/24/22 0745)  Resp: 17 (08/24/22 0745)  BP: (!) 142/65 (08/24/22 0745)  SpO2: 100 % (08/24/22 0745)   Vital Signs (24h Range):  Temp:  [97.6 °F (36.4 °C)-98.7 °F (37.1 °C)] 98.2 °F (36.8 °C)  Pulse:  [45-62] 50  Resp:  [15-20] 17  SpO2:  [96 %-100 %] 100 %  BP: (119-168)/(61-77) 142/65     Weight: 52.2 kg (115 lb)  Body mass index is 18.56 kg/m².    SpO2: 100 %  O2 Device (Oxygen Therapy): room air    No intake or output data in the 24 hours ending 08/24/22 0927    Lines/Drains/Airways       Peripheral Intravenous Line  Duration                  Peripheral IV - Single Lumen 08/23/22 2145 20 G Left Antecubital <1 day                    Physical Exam  Vitals and nursing note reviewed.   Constitutional:       General: She is not in acute distress.     Appearance: Normal appearance. She is well-developed. She is not diaphoretic.   HENT:      Head: Normocephalic and  atraumatic.   Eyes:      General:         Right eye: No discharge.         Left eye: No discharge.      Pupils: Pupils are equal, round, and reactive to light.   Neck:      Thyroid: No thyromegaly.      Vascular: No JVD.      Trachea: No tracheal deviation.   Cardiovascular:      Rate and Rhythm: Normal rate and regular rhythm.      Heart sounds: Normal heart sounds, S1 normal and S2 normal. No murmur heard.  Pulmonary:      Effort: Pulmonary effort is normal. No respiratory distress.      Breath sounds: Normal breath sounds. No wheezing or rales.   Abdominal:      General: There is no distension.      Tenderness: There is no rebound.   Musculoskeletal:      Cervical back: Neck supple.      Right lower leg: No edema.      Left lower leg: No edema.   Skin:     General: Skin is warm and dry.      Findings: No erythema.   Neurological:      General: No focal deficit present.      Mental Status: She is alert and oriented to person, place, and time.   Psychiatric:         Mood and Affect: Mood normal.         Behavior: Behavior normal.         Thought Content: Thought content normal.       Significant Labs: CMP   Recent Labs   Lab 08/22/22  1624 08/23/22 2018    142   K 4.0 3.7   CL  --  106   CO2 29 27   GLU  --  86   BUN 12.0 11   CREATININE 0.67 0.7   CALCIUM 9.8 9.4   ALBUMIN 3.5  --    BILITOT 0.4  --    ALKPHOS 60  --    AST 19  --    ALT 20  --    ANIONGAP  --  9   , CBC   Recent Labs   Lab 08/22/22  1624 08/23/22 2018   WBC 15.5* 11.16   HGB 11.5* 10.8*   HCT 36.8* 35.6*    226   , Troponin   Recent Labs   Lab 08/22/22  1934 08/23/22  0615 08/23/22  2018   TROPONINI 0.303* 0.106* 0.032*   , and All pertinent lab results from the last 24 hours have been reviewed.    Significant Imaging: Echocardiogram: Transthoracic echo (TTE) complete (Cupid Only):   Results for orders placed or performed during the hospital encounter of 08/23/22   Echo   Result Value Ref Range    BSA 1.56 m2    TDI SEPTAL 0.06 m/s     LV LATERAL E/E' RATIO 13.71 m/s    LV SEPTAL E/E' RATIO 16.00 m/s    LA WIDTH 3.40 cm    IVC diameter 2.04 cm    Left Ventricular Outflow Tract Mean Velocity 0.90 cm/s    Left Ventricular Outflow Tract Mean Gradient 3.67 mmHg    TDI LATERAL 0.07 m/s    LVIDd 4.16 3.5 - 6.0 cm    IVS 0.94 0.6 - 1.1 cm    Posterior Wall 0.97 0.6 - 1.1 cm    Ao root annulus 2.86 cm    LVIDs 2.81 2.1 - 4.0 cm    FS 32 28 - 44 %    LA volume 39.18 cm3    STJ 2.55 cm    Ascending aorta 2.53 cm    LV mass 126.78 g    LA size 3.10 cm    TAPSE 1.90 cm    Left Ventricle Relative Wall Thickness 0.47 cm    AV mean gradient 5 mmHg    AV valve area 1.84 cm2    AV Velocity Ratio 0.81     AV index (prosthetic) 0.78     PV peak gradient 2.39 mmHg    E/A ratio 1.07     Mean e' 0.07 m/s    E wave deceleration time 251.21 msec    IVRT 72.31 msec    LVOT diameter 1.73 cm    LVOT area 2.3 cm2    LVOT peak madhu 1.40 m/s    LVOT peak VTI 31.40 cm    Ao peak madhu 1.72 m/s    Ao VTI 40.1 cm    RVOT peak madhu 0.77 m/s    RVOT peak VTI 21.5 cm    LVOT stroke volume 73.77 cm3    AV peak gradient 12 mmHg    PV mean gradient 1.42 mmHg    E/E' ratio 14.77 m/s    MV Peak E Madhu 0.96 m/s    TR Max Madhu 2.84 m/s    MV Peak A Madhu 0.90 m/s    LV Systolic Volume 29.73 mL    LV Systolic Volume Index 18.8 mL/m2    LV Diastolic Volume 76.85 mL    LV Diastolic Volume Index 48.64 mL/m2    LA Volume Index 24.8 mL/m2    LV Mass Index 80 g/m2    RA Major Axis 3.79 cm    Left Atrium Minor Axis 4.47 cm    Left Atrium Major Axis 4.28 cm    Triscuspid Valve Regurgitation Peak Gradient 32 mmHg    RA Width 2.80 cm   , EKG: Reviewed, and X-Ray: CXR: X-Ray Chest 1 View (CXR): No results found for this visit on 08/23/22. and X-Ray Chest PA and Lateral (CXR): No results found for this visit on 08/23/22.

## 2022-08-24 NOTE — DISCHARGE INSTRUCTIONS
"  Post-op Heart Catheterization    1. DIET: It is advisable for you to follow a diet that limits the intake of salt, sugar, saturated fats and cholesterol.     2. DRIVING: Due to sedation you received during your procedure, DO NOT drive or operate machinery for 24 hours. Avoid making critical decisions or signing legal documents until tomorrow.    3. ACTIVITY: AVOID activities that require bending of the affected arm/wrist for 3 days and submerging the site in water for 3 days. REMOVE the dressing the day after  the procedure, and shower.  Apply a bandaid to site after shower.  WEAR wrist immobilizer until tomorrow night.    You may RESUME your normal activities or prescribed exercise program as instructed by your physician after 3 days.                                                                                                                 4. WOUND CARE: It is not unusual to have a small amount of bruising to appear at or near the puncture site. It is also common to have a tender "knot" develop beneath the skin at the puncture site of the wrist/arm. This is usually scar tissue and is not a cause for concern or alarm. This tender knot may take several weeks to fully resolve. The bruise will usually spread over several days. However, if the lump gets bigger, call your doctor immediately.    5. DISCOMFORT: For general discomfort at the puncture site, you may take 1 or 2 Acetaminophen (Tylenol) tablets every 4 - 6 hours as needed. (Do not take more than 4000 mg a day)    6. SIGNS AND SYMPTOMS TO REPORT:  Call your physician immediately if any of the following occur:                                            1. Loss of feeling, warmth or color to the affected arm/wrist                                                                                                          2. Mild beeding from the site                 3. Pain that is sudden, sharp or persistent in the affected arm/wrist                 4. " "Swelling or a change in "lump" size, increased redness or drainage at the puncture site                                                                               5. High fever (101 degrees or higher)    7. GO TO  THE EMERGENCY ROOM OR CALL 911 IF YOU HAVE: Chest pains or discomforts not relieved with 3 nitroglycerin doses (sublingual tablets or spray), numbness or severe pain of limb, if your limb becomes cold or discolored or if you develop uncontrolled bleeding from the puncture site (quickly apply firm, direct pressure above the site).   "

## 2022-08-24 NOTE — SUBJECTIVE & OBJECTIVE
Review of Systems   Constitutional:  Negative for fatigue and fever.   HENT:  Negative for sinus pressure.    Eyes:  Negative for visual disturbance.   Respiratory:  Negative for shortness of breath.    Cardiovascular:  Positive for chest pain (resolved).   Gastrointestinal:  Negative for nausea and vomiting.   Genitourinary:  Negative for difficulty urinating.   Musculoskeletal:  Negative for back pain.   Skin:  Negative for rash.   Neurological:  Negative for headaches.   Psychiatric/Behavioral:  Negative for confusion.    Objective:     Vital Signs (Most Recent):  Temp: 98.2 °F (36.8 °C) (08/24/22 0745)  Pulse: (!) 50 (08/24/22 0745)  Resp: 17 (08/24/22 0745)  BP: (!) 142/65 (08/24/22 0745)  SpO2: 100 % (08/24/22 0745)   Vital Signs (24h Range):  Temp:  [97.6 °F (36.4 °C)-98.7 °F (37.1 °C)] 98.2 °F (36.8 °C)  Pulse:  [45-62] 50  Resp:  [15-20] 17  SpO2:  [96 %-100 %] 100 %  BP: (127-168)/(61-74) 142/65     Weight: 52.2 kg (115 lb)  Body mass index is 18.56 kg/m².  No intake or output data in the 24 hours ending 08/24/22 1019   Physical Exam  Constitutional:       General: She is not in acute distress.     Appearance: She is well-developed. She is not diaphoretic.   HENT:      Head: Normocephalic and atraumatic.   Eyes:      Pupils: Pupils are equal, round, and reactive to light.   Cardiovascular:      Rate and Rhythm: Normal rate and regular rhythm.      Heart sounds: Normal heart sounds. No murmur heard.    No friction rub. No gallop.   Pulmonary:      Effort: Pulmonary effort is normal. No respiratory distress.      Breath sounds: Normal breath sounds. No stridor. No wheezing or rales.   Abdominal:      General: Bowel sounds are normal. There is no distension.      Palpations: Abdomen is soft. There is no mass.      Tenderness: There is no abdominal tenderness. There is no guarding.   Musculoskeletal:      Right lower leg: No edema.      Left lower leg: No edema.   Skin:     General: Skin is warm.       Findings: No erythema.   Neurological:      Mental Status: She is alert and oriented to person, place, and time.       Significant Labs: All pertinent labs within the past 24 hours have been reviewed.    Significant Imaging: I have reviewed all pertinent imaging results/findings within the past 24 hours.

## 2022-08-24 NOTE — SUBJECTIVE & OBJECTIVE
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     Hypercholesterolemia     Hypertension     Lumbar disc disease        Past Surgical History:   Procedure Laterality Date    APPENDECTOMY      CHOLECYSTECTOMY      OOPHORECTOMY      TONSILLECTOMY         Review of patient's allergies indicates:   Allergen Reactions    Codeine     Penicillins     Propoxyphene     Propoxyphene-asa-caffeine        Current Facility-Administered Medications on File Prior to Encounter   Medication    [COMPLETED] aspirin tablet 325 mg    [COMPLETED] atorvastatin tablet 80 mg    [COMPLETED] enoxaparin injection 60 mg    [DISCONTINUED] enoxaparin injection 60 mg     Current Outpatient Medications on File Prior to Encounter   Medication Sig    albuterol (PROVENTIL/VENTOLIN HFA) 90 mcg/actuation inhaler Inhale 2 puffs into the lungs every 4 (four) hours as needed.    albuterol-ipratropium (DUO-NEB) 2.5 mg-0.5 mg/3 mL nebulizer solution ipratropium 0.5 mg-albuterol 3 mg (2.5 mg base)/3 mL nebulization soln    ascorbic acid, vitamin C, (VITAMIN C) 500 MG tablet Take 500 mg by mouth.    aspirin 81 MG Chew Take 81 mg by mouth.    atorvastatin (LIPITOR) 40 MG tablet atorvastatin 40 mg tablet    azithromycin (Z-DENILSON) 250 MG tablet Zithromax Z-Denilson 250 mg tablet   TAKE 2 TABLETS (500 MG) BY ORAL ROUTE ONCE DAILY FOR 1 DAY THEN 1 TABLET (250 MG) BY ORAL ROUTE ONCE DAILY FOR 4 DAYS    busPIRone (BUSPAR) 15 MG tablet Take 15 mg by mouth.    carvediloL (COREG) 3.125 MG tablet carvedilol 3.125 mg tablet    coenzyme Q10 10 mg capsule Take 10 mg by mouth.    denosumab (PROLIA) 60 mg/mL Syrg Inject 60 mg into the skin.    lisinopriL (PRINIVIL,ZESTRIL) 30 MG tablet Take 30 mg by mouth.    loratadine (CLARITIN) 10 mg tablet Take 10 mg by mouth daily as needed.    losartan (COZAAR) 25 MG tablet losartan 25 mg tablet    meclizine (ANTIVERT) 25 MG tablet Take 25 mg by mouth daily as needed.    melatonin 10 mg Tab Take by mouth.     polyethylene glycol (GLYCOLAX) 17 gram/dose powder polyethylene glycol 3350 17 gram/dose oral powder    potassium citrate 99 mg Cap Take by mouth.    rosuvastatin (CRESTOR) 20 MG tablet rosuvastatin 20 mg tablet     Family History       Problem Relation (Age of Onset)    Diabetes Mother    Heart disease Mother, Father    Hypertension Mother    Sarcoidosis Mother    Stroke Father          Tobacco Use    Smoking status: Current Every Day Smoker     Packs/day: 1.00     Types: Cigarettes    Smokeless tobacco: Never Used   Substance and Sexual Activity    Alcohol use: Never    Drug use: Never    Sexual activity: Not on file     Review of Systems   Constitutional:  Negative for fatigue and fever.   HENT:  Negative for sinus pressure.    Eyes:  Negative for visual disturbance.   Respiratory:  Negative for shortness of breath.    Cardiovascular:  Positive for chest pain (resolved).   Gastrointestinal:  Negative for nausea and vomiting.   Genitourinary:  Negative for difficulty urinating.   Musculoskeletal:  Negative for back pain.   Skin:  Negative for rash.   Neurological:  Negative for headaches.   Psychiatric/Behavioral:  Negative for confusion.    Objective:     Vital Signs (Most Recent):  Temp: 98.7 °F (37.1 °C) (08/23/22 1947)  Pulse: (!) 52 (08/23/22 1947)  Resp: 20 (08/23/22 1947)  BP: (!) 168/73 (08/23/22 1947)  SpO2: 96 % (08/23/22 1947)   Vital Signs (24h Range):  Temp:  [97.6 °F (36.4 °C)-98.7 °F (37.1 °C)] 98.7 °F (37.1 °C)  Pulse:  [44-56] 52  Resp:  [14-21] 20  SpO2:  [96 %-100 %] 96 %  BP: ()/(48-77) 168/73        There is no height or weight on file to calculate BMI.    Physical Exam  Constitutional:       General: She is not in acute distress.     Appearance: She is well-developed. She is not diaphoretic.   HENT:      Head: Normocephalic and atraumatic.   Eyes:      Pupils: Pupils are equal, round, and reactive to light.   Cardiovascular:      Rate and Rhythm: Normal rate and regular rhythm.       Heart sounds: Normal heart sounds. No murmur heard.    No friction rub. No gallop.   Pulmonary:      Effort: Pulmonary effort is normal. No respiratory distress.      Breath sounds: Normal breath sounds. No stridor. No wheezing or rales.   Abdominal:      General: Bowel sounds are normal. There is no distension.      Palpations: Abdomen is soft. There is no mass.      Tenderness: There is no abdominal tenderness. There is no guarding.   Musculoskeletal:      Right lower leg: No edema.      Left lower leg: No edema.   Skin:     General: Skin is warm.      Findings: No erythema.   Neurological:      Mental Status: She is alert and oriented to person, place, and time.         CRANIAL NERVES     CN III, IV, VI   Pupils are equal, round, and reactive to light.     Significant Labs:   Results for orders placed or performed during the hospital encounter of 08/22/22   Comprehensive metabolic panel   Result Value Ref Range    Sodium Level 143 136 - 145 mmol/L    Potassium Level 4.0 3.5 - 5.1 mmol/L    Chloride 105 98 - 107 mmol/L    Carbon Dioxide 29 23 - 31 mmol/L    Glucose Level 145 (H) 82 - 115 mg/dL    Blood Urea Nitrogen 12.0 9.8 - 20.1 mg/dL    Creatinine 0.67 0.55 - 1.02 mg/dL    Calcium Level Total 9.8 8.4 - 10.2 mg/dL    Protein Total 6.5 5.8 - 7.6 gm/dL    Albumin Level 3.5 3.4 - 4.8 gm/dL    Globulin 3.0 2.4 - 3.5 gm/dL    Albumin/Globulin Ratio 1.2 1.1 - 2.0 ratio    Bilirubin Total 0.4 <=1.5 mg/dL    Alkaline Phosphatase 60 40 - 150 unit/L    Alanine Aminotransferase 20 0 - 55 unit/L    Aspartate Aminotransferase 19 5 - 34 unit/L    eGFR >60 mls/min/1.73/m2   Troponin I #1   Result Value Ref Range    Troponin-I 0.048 (H) 0.000 - 0.045 ng/mL   BNP   Result Value Ref Range    Natriuretic Peptide 43.6 <=100.0 pg/mL   CBC with Differential   Result Value Ref Range    WBC 15.5 (H) 4.5 - 11.5 x10(3)/mcL    RBC 4.08 (L) 4.20 - 5.40 x10(6)/mcL    Hgb 11.5 (L) 12.0 - 16.0 gm/dL    Hct 36.8 (L) 37.0 - 47.0 %    MCV 90.2  80.0 - 94.0 fL    MCH 28.2 27.0 - 31.0 pg    MCHC 31.3 (L) 33.0 - 36.0 mg/dL    RDW 15.0 11.5 - 17.0 %    Platelet 229 130 - 400 x10(3)/mcL    MPV 10.3 7.4 - 10.4 fL    Neut % 78.2 %    Lymph % 16.6 %    Mono % 3.6 %    Eos % 1.2 %    Basophil % 0.1 %    Lymph # 2.57 0.6 - 4.6 x10(3)/mcL    Neut # 12.2 (H) 2.1 - 9.2 x10(3)/mcL    Mono # 0.56 0.1 - 1.3 x10(3)/mcL    Eos # 0.18 0 - 0.9 x10(3)/mcL    Baso # 0.01 0 - 0.2 x10(3)/mcL    IG# 0.05 (H) 0 - 0.04 x10(3)/mcL    IG% 0.3 %   Troponin I   Result Value Ref Range    Troponin-I 0.303 (H) 0.000 - 0.045 ng/mL   Troponin I   Result Value Ref Range    Troponin-I 0.106 (H) 0.000 - 0.045 ng/mL         Significant Imaging: X-Ray Chest AP Portable  Narrative: EXAMINATION:  XR CHEST AP PORTABLE    CLINICAL HISTORY:  Chest Pain;, .    COMPARISON:  08/19/2022    FINDINGS:  An AP view or more reveals the heart to be normal in size.  The trachea is midline.  Atherosclerosis is seen within the aorta.  Hazy interstitial opacities are scattered throughout the lungs bilaterally.  Degenerative changes and curvature are noted to the thoracic spine.  Bony structures are osteopenic.  Impression: 1. No active cardiopulmonary disease identified    Electronically signed by: Isaac Georges  Date:    08/22/2022  Time:    16:55

## 2022-08-24 NOTE — HOSPITAL COURSE
Admitted for NSTEMI, started on heparin, aspirin, statin. Troponin downtrending, cardiology consulted. Normal coronaries and EF per [facility]. Stable for d/c home with PCP and cards f/u

## 2022-08-24 NOTE — ASSESSMENT & PLAN NOTE
Chest pain  Troponin trending down  QUETA score: 4  Heparin started  Cardiology consulted, f/u recs  Echo   NPO at midnight     Advance Care Planning     Patient is dnr and sdm is her .

## 2022-08-25 VITALS
HEIGHT: 66 IN | DIASTOLIC BLOOD PRESSURE: 70 MMHG | BODY MASS INDEX: 18.92 KG/M2 | RESPIRATION RATE: 17 BRPM | WEIGHT: 117.75 LBS | SYSTOLIC BLOOD PRESSURE: 155 MMHG | HEART RATE: 47 BPM | OXYGEN SATURATION: 100 % | TEMPERATURE: 98 F

## 2022-08-25 LAB
ANION GAP SERPL CALC-SCNC: 12 MMOL/L (ref 8–16)
BASOPHILS # BLD AUTO: 0.03 K/UL (ref 0–0.2)
BASOPHILS NFR BLD: 0.3 % (ref 0–1.9)
BUN SERPL-MCNC: 17 MG/DL (ref 8–23)
CALCIUM SERPL-MCNC: 9 MG/DL (ref 8.7–10.5)
CHLORIDE SERPL-SCNC: 103 MMOL/L (ref 95–110)
CO2 SERPL-SCNC: 22 MMOL/L (ref 23–29)
CREAT SERPL-MCNC: 0.8 MG/DL (ref 0.5–1.4)
DIFFERENTIAL METHOD: ABNORMAL
EOSINOPHIL # BLD AUTO: 0.4 K/UL (ref 0–0.5)
EOSINOPHIL NFR BLD: 3.5 % (ref 0–8)
ERYTHROCYTE [DISTWIDTH] IN BLOOD BY AUTOMATED COUNT: 14.6 % (ref 11.5–14.5)
EST. GFR  (NO RACE VARIABLE): >60 ML/MIN/1.73 M^2
GLUCOSE SERPL-MCNC: 169 MG/DL (ref 70–110)
HCT VFR BLD AUTO: 38.5 % (ref 37–48.5)
HGB BLD-MCNC: 11.3 G/DL (ref 12–16)
IMM GRANULOCYTES # BLD AUTO: 0.03 K/UL (ref 0–0.04)
IMM GRANULOCYTES NFR BLD AUTO: 0.3 % (ref 0–0.5)
LYMPHOCYTES # BLD AUTO: 3 K/UL (ref 1–4.8)
LYMPHOCYTES NFR BLD: 27.3 % (ref 18–48)
MCH RBC QN AUTO: 26.9 PG (ref 27–31)
MCHC RBC AUTO-ENTMCNC: 29.4 G/DL (ref 32–36)
MCV RBC AUTO: 92 FL (ref 82–98)
MONOCYTES # BLD AUTO: 0.7 K/UL (ref 0.3–1)
MONOCYTES NFR BLD: 6.6 % (ref 4–15)
NEUTROPHILS # BLD AUTO: 6.9 K/UL (ref 1.8–7.7)
NEUTROPHILS NFR BLD: 62 % (ref 38–73)
NRBC BLD-RTO: 0 /100 WBC
PLATELET # BLD AUTO: 187 K/UL (ref 150–450)
PMV BLD AUTO: 11.3 FL (ref 9.2–12.9)
POTASSIUM SERPL-SCNC: 4.3 MMOL/L (ref 3.5–5.1)
RBC # BLD AUTO: 4.2 M/UL (ref 4–5.4)
SODIUM SERPL-SCNC: 137 MMOL/L (ref 136–145)
WBC # BLD AUTO: 11.14 K/UL (ref 3.9–12.7)

## 2022-08-25 PROCEDURE — 99232 SBSQ HOSP IP/OBS MODERATE 35: CPT | Mod: ,,, | Performed by: INTERNAL MEDICINE

## 2022-08-25 PROCEDURE — 99232 PR SUBSEQUENT HOSPITAL CARE,LEVL II: ICD-10-PCS | Mod: ,,, | Performed by: INTERNAL MEDICINE

## 2022-08-25 PROCEDURE — 36415 COLL VENOUS BLD VENIPUNCTURE: CPT | Performed by: PHYSICIAN ASSISTANT

## 2022-08-25 PROCEDURE — 85025 COMPLETE CBC W/AUTO DIFF WBC: CPT | Performed by: PHYSICIAN ASSISTANT

## 2022-08-25 PROCEDURE — 80048 BASIC METABOLIC PNL TOTAL CA: CPT | Performed by: PHYSICIAN ASSISTANT

## 2022-08-25 PROCEDURE — 25000003 PHARM REV CODE 250: Performed by: FAMILY MEDICINE

## 2022-08-25 RX ADMIN — ASPIRIN 81 MG CHEWABLE TABLET 81 MG: 81 TABLET CHEWABLE at 08:08

## 2022-08-25 RX ADMIN — ATORVASTATIN CALCIUM 40 MG: 40 TABLET, FILM COATED ORAL at 08:08

## 2022-08-25 NOTE — HOSPITAL COURSE
8/25/22-Patient seen and examined today, s/p [facility] yesterday which showed normal coronaries. Feels well. No CV complaints. Labs stable.

## 2022-08-25 NOTE — PROGRESS NOTES
"[facility] - Telemetry ([facility])  Cardiology  Progress Note    Patient Name: Tania Figueroa  MRN: [id number]  Admission Date: 8/23/2022  Hospital Length of Stay: 2 days  Code Status: DNR   Attending Physician: Rola Godwin MD   Primary Care Physician: Yasmany Verdugo MD  Expected Discharge Date: 8/25/2022  Principal Problem:NSTEMI (non-ST elevated myocardial infarction)    Subjective:   HPI:  Ms. Figueroa is a 67 year old female patient whose current medical conditions include HTN and hyerplipidemia who initially presented to [facility] ED in [place], [place] via EMS due to concerns of a possible anaphylactic reaction to an insect bite. Patient was sitting outside when she began to experience chest pain with associated lightheadedness and diaphoresis. En route, she received ASA and sublingual nitro. Initial workup revealed troponin of 0.048>0.3 and patient was subsequently transferred to [facility] for admission and further treatment. Cardiology consulted to assist with management. Patient seen and examined today, resting in bed. Feels ok. Still complains of mild chest heaviness and soreness, less intense than yesterday. Denies any prior history of MI or CAD. Previously had "normal cath" approximately 3 years ago with [facility] in [place]. States she recently stopped ASA and statin in anticipation of scheduled spinal surgery due to lumbar disease on 8/30/22. Chart reviewed. Troponin 0.048>-0.303>0.106>0.032. Echo pending. EKG showed sinus bradycardia, non-specific T wave abnormality.     Hospital Course:   8/25/22-Patient seen and examined today, s/p [facility] yesterday which showed normal coronaries. Feels well. No CV complaints. Labs stable.         Review of Systems   Constitutional: Negative.   HENT: Negative.     Eyes: Negative.    Cardiovascular: Negative.    Respiratory: Negative.     Endocrine: Negative.    Hematologic/Lymphatic: Negative.    Skin: Negative.    Musculoskeletal:  Positive for arthritis and back pain. "   Gastrointestinal: Negative.    Genitourinary: Negative.    Neurological: Negative.    Psychiatric/Behavioral: Negative.     Allergic/Immunologic: Negative.    Objective:     Vital Signs (Most Recent):  Temp: 98.2 °F (36.8 °C) (08/25/22 1116)  Pulse: (!) 47 (08/25/22 1116)  Resp: 17 (08/25/22 1116)  BP: (!) 155/70 (08/25/22 1116)  SpO2: 100 % (08/25/22 1116)   Vital Signs (24h Range):  Temp:  [98 °F (36.7 °C)-98.5 °F (36.9 °C)] 98.2 °F (36.8 °C)  Pulse:  [44-59] 47  Resp:  [15-20] 17  SpO2:  [94 %-100 %] 100 %  BP: (113-155)/(54-70) 155/70     Weight: 53.4 kg (117 lb 11.6 oz)  Body mass index is 19 kg/m².     SpO2: 100 %  O2 Device (Oxygen Therapy): room air      Intake/Output Summary (Last 24 hours) at 8/25/2022 1315  Last data filed at 8/24/2022 1640  Gross per 24 hour   Intake 336.77 ml   Output --   Net 336.77 ml       Lines/Drains/Airways       None                   Physical Exam  Vitals and nursing note reviewed.   Constitutional:       General: She is not in acute distress.     Appearance: Normal appearance. She is well-developed. She is not diaphoretic.   HENT:      Head: Normocephalic and atraumatic.   Eyes:      General:         Right eye: No discharge.         Left eye: No discharge.      Pupils: Pupils are equal, round, and reactive to light.   Neck:      Thyroid: No thyromegaly.      Vascular: No JVD.      Trachea: No tracheal deviation.   Cardiovascular:      Rate and Rhythm: Regular rhythm. Bradycardia present.      Heart sounds: Normal heart sounds, S1 normal and S2 normal. No murmur heard.  Pulmonary:      Effort: Pulmonary effort is normal. No respiratory distress.      Breath sounds: Normal breath sounds. No wheezing or rales.   Abdominal:      General: There is no distension.      Palpations: Abdomen is soft.      Tenderness: There is no rebound.   Musculoskeletal:      Cervical back: Neck supple.      Right lower leg: No edema.      Left lower leg: No edema.   Skin:     General: Skin is warm  and dry.      Findings: No erythema.      Comments: Radial access site C/D/I; no bleeding erythema or drainage  Intact pulse   Neurological:      General: No focal deficit present.      Mental Status: She is alert and oriented to person, place, and time.   Psychiatric:         Mood and Affect: Mood normal.         Behavior: Behavior normal.         Thought Content: Thought content normal.       Significant Labs: CMP   Recent Labs   Lab 08/23/22 2018 08/25/22  0824    137   K 3.7 4.3    103   CO2 27 22*   GLU 86 169*   BUN 11 17   CREATININE 0.7 0.8   CALCIUM 9.4 9.0   ANIONGAP 9 12   , CBC   Recent Labs   Lab 08/23/22 2018 08/25/22  0824   WBC 11.16 11.14   HGB 10.8* 11.3*   HCT 35.6* 38.5    187   , Troponin   Recent Labs   Lab 08/23/22 2018   TROPONINI 0.032*   , and All pertinent lab results from the last 24 hours have been reviewed.    Significant Imaging: Echocardiogram: Transthoracic echo (TTE) complete ([facility] Only):   Results for orders placed or performed during the hospital encounter of 08/23/22   Echo   Result Value Ref Range    BSA 1.56 m2    TDI SEPTAL 0.06 m/s    LV LATERAL E/E' RATIO 13.71 m/s    LV SEPTAL E/E' RATIO 16.00 m/s    LA WIDTH 3.40 cm    IVC diameter 2.04 cm    Left Ventricular Outflow Tract Mean Velocity 0.90 cm/s    Left Ventricular Outflow Tract Mean Gradient 3.67 mmHg    TDI LATERAL 0.07 m/s    LVIDd 4.16 3.5 - 6.0 cm    IVS 0.94 0.6 - 1.1 cm    Posterior Wall 0.97 0.6 - 1.1 cm    Ao root annulus 2.86 cm    LVIDs 2.81 2.1 - 4.0 cm    FS 32 28 - 44 %    LA volume 39.18 cm3    STJ 2.55 cm    Ascending aorta 2.53 cm    LV mass 126.78 g    LA size 3.10 cm    TAPSE 1.90 cm    Left Ventricle Relative Wall Thickness 0.47 cm    AV mean gradient 5 mmHg    AV valve area 1.84 cm2    AV Velocity Ratio 0.81     AV index (prosthetic) 0.78     E/A ratio 1.07     Mean e' 0.07 m/s    E wave deceleration time 251.21 msec    IVRT 72.31 msec    LVOT diameter 1.73 cm    LVOT area 2.3  cm2    LVOT peak madhu 1.40 m/s    LVOT peak VTI 31.40 cm    Ao peak madhu 1.72 m/s    Ao VTI 40.1 cm    RVOT peak madhu 0.77 m/s    RVOT peak VTI 21.5 cm    LVOT stroke volume 73.77 cm3    AV peak gradient 12 mmHg    PV mean gradient 1.42 mmHg    E/E' ratio 14.77 m/s    MV Peak E Madhu 0.96 m/s    TR Max Madhu 2.84 m/s    MV Peak A Madhu 0.90 m/s    LV Systolic Volume 29.73 mL    LV Systolic Volume Index 18.8 mL/m2    LV Diastolic Volume 76.85 mL    LV Diastolic Volume Index 48.64 mL/m2    LA Volume Index 24.8 mL/m2    LV Mass Index 80 g/m2    RA Major Axis 3.79 cm    Left Atrium Minor Axis 4.47 cm    Left Atrium Major Axis 4.28 cm    Triscuspid Valve Regurgitation Peak Gradient 32 mmHg    RA Width 2.80 cm    Right Atrial Pressure (from IVC) 8 mmHg    EF 65 %    TV rest pulmonary artery pressure 40 mmHg    Narrative    · The left ventricle is normal in size with concentric remodeling and   normal systolic function.  · Grade II left ventricular diastolic dysfunction.  · The estimated PA systolic pressure is 40 mmHg.  · Normal right ventricular size with normal right ventricular systolic   function.  · Intermediate central venous pressure (8 mmHg).  · The estimated ejection fraction is 65%.  · Mild tricuspid regurgitation.      , EKG: Reviewed, and X-Ray: CXR: X-Ray Chest 1 View (CXR): No results found for this visit on 08/23/22. and X-Ray Chest PA and Lateral (CXR): No results found for this visit on 08/23/22.    Assessment and Plan:   Patient who presents with chest pain/elevated troponin. LHC showed normal coronaries. Continue ASA, statin.     * NSTEMI (non-ST elevated myocardial infarction)  -Presents with chest pain symptoms along with elevated troponin  -Continue ASA, statin  -Echo pending  -[facility] today by Dr. Ragsdale. All risks, benefits, and treatment alternatives explained to patient in detail. All questions answered. She has agreed to proceed.    8/25/22  -s/p LHC which showed normal coronaries  -Type II NSTEMI  secondary to demand  -Continue ASA, statin    Bradycardia  -BB held    Tobacco abuse  -Cessation advised    HLD (hyperlipidemia)  -Continue statin        VTE Risk Mitigation (From admission, onward)         Ordered     IP VTE LOW RISK PATIENT  Once         08/23/22 1941                Araseli Figueroa PA-C  Cardiology  [facility] - Telemetry ([facility])

## 2022-08-25 NOTE — PLAN OF CARE
[facility] - Telemetry (Hospital)  Discharge Final Note    Primary Care Provider: Yasmany Verdugo MD    Expected Discharge Date: 8/25/2022    Final Discharge Note (most recent)     Final Note - 08/25/22 1402        Final Note    Assessment Type Final Discharge Note     Anticipated Discharge Disposition Home or Self Care        Post-Acute Status    Discharge Delays None known at this time                 Important Message from Medicare  Important Message from Medicare regarding Discharge Appeal Rights: Given to patient/caregiver, Explained to patient/caregiver, Signed/date by patient/caregiver     Date IMM was signed: 08/25/22  Time IMM was signed: 1129    Contact Info     Yasmany Verdugo MD   Specialty: Internal Medicine   Relationship: PCP - General    [facility]  [address]  [facility] [address]   Phone: [phone]       Next Steps: Follow up in 1 week(s)

## 2022-08-25 NOTE — SUBJECTIVE & OBJECTIVE
Review of Systems   Constitutional: Negative.   HENT: Negative.     Eyes: Negative.    Cardiovascular: Negative.    Respiratory: Negative.     Endocrine: Negative.    Hematologic/Lymphatic: Negative.    Skin: Negative.    Musculoskeletal:  Positive for arthritis and back pain.   Gastrointestinal: Negative.    Genitourinary: Negative.    Neurological: Negative.    Psychiatric/Behavioral: Negative.     Allergic/Immunologic: Negative.    Objective:     Vital Signs (Most Recent):  Temp: 98.2 °F (36.8 °C) (08/25/22 1116)  Pulse: (!) 47 (08/25/22 1116)  Resp: 17 (08/25/22 1116)  BP: (!) 155/70 (08/25/22 1116)  SpO2: 100 % (08/25/22 1116)   Vital Signs (24h Range):  Temp:  [98 °F (36.7 °C)-98.5 °F (36.9 °C)] 98.2 °F (36.8 °C)  Pulse:  [44-59] 47  Resp:  [15-20] 17  SpO2:  [94 %-100 %] 100 %  BP: (113-155)/(54-70) 155/70     Weight: 53.4 kg (117 lb 11.6 oz)  Body mass index is 19 kg/m².     SpO2: 100 %  O2 Device (Oxygen Therapy): room air      Intake/Output Summary (Last 24 hours) at 8/25/2022 1315  Last data filed at 8/24/2022 1640  Gross per 24 hour   Intake 336.77 ml   Output --   Net 336.77 ml       Lines/Drains/Airways       None                   Physical Exam  Vitals and nursing note reviewed.   Constitutional:       General: She is not in acute distress.     Appearance: Normal appearance. She is well-developed. She is not diaphoretic.   HENT:      Head: Normocephalic and atraumatic.   Eyes:      General:         Right eye: No discharge.         Left eye: No discharge.      Pupils: Pupils are equal, round, and reactive to light.   Neck:      Thyroid: No thyromegaly.      Vascular: No JVD.      Trachea: No tracheal deviation.   Cardiovascular:      Rate and Rhythm: Regular rhythm. Bradycardia present.      Heart sounds: Normal heart sounds, S1 normal and S2 normal. No murmur heard.  Pulmonary:      Effort: Pulmonary effort is normal. No respiratory distress.      Breath sounds: Normal breath sounds. No wheezing or  rales.   Abdominal:      General: There is no distension.      Palpations: Abdomen is soft.      Tenderness: There is no rebound.   Musculoskeletal:      Cervical back: Neck supple.      Right lower leg: No edema.      Left lower leg: No edema.   Skin:     General: Skin is warm and dry.      Findings: No erythema.      Comments: Radial access site C/D/I; no bleeding erythema or drainage  Intact pulse   Neurological:      General: No focal deficit present.      Mental Status: She is alert and oriented to person, place, and time.   Psychiatric:         Mood and Affect: Mood normal.         Behavior: Behavior normal.         Thought Content: Thought content normal.       Significant Labs: CMP   Recent Labs   Lab 08/23/22  2018 08/25/22  0824    137   K 3.7 4.3    103   CO2 27 22*   GLU 86 169*   BUN 11 17   CREATININE 0.7 0.8   CALCIUM 9.4 9.0   ANIONGAP 9 12   , CBC   Recent Labs   Lab 08/23/22 2018 08/25/22  0824   WBC 11.16 11.14   HGB 10.8* 11.3*   HCT 35.6* 38.5    187   , Troponin   Recent Labs   Lab 08/23/22 2018   TROPONINI 0.032*   , and All pertinent lab results from the last 24 hours have been reviewed.    Significant Imaging: Echocardiogram: Transthoracic echo (TTE) complete ([facility] Only):   Results for orders placed or performed during the hospital encounter of 08/23/22   Echo   Result Value Ref Range    BSA 1.56 m2    TDI SEPTAL 0.06 m/s    LV LATERAL E/E' RATIO 13.71 m/s    LV SEPTAL E/E' RATIO 16.00 m/s    LA WIDTH 3.40 cm    IVC diameter 2.04 cm    Left Ventricular Outflow Tract Mean Velocity 0.90 cm/s    Left Ventricular Outflow Tract Mean Gradient 3.67 mmHg    TDI LATERAL 0.07 m/s    LVIDd 4.16 3.5 - 6.0 cm    IVS 0.94 0.6 - 1.1 cm    Posterior Wall 0.97 0.6 - 1.1 cm    Ao root annulus 2.86 cm    LVIDs 2.81 2.1 - 4.0 cm    FS 32 28 - 44 %    LA volume 39.18 cm3    STJ 2.55 cm    Ascending aorta 2.53 cm    LV mass 126.78 g    LA size 3.10 cm    TAPSE 1.90 cm    Left Ventricle  Relative Wall Thickness 0.47 cm    AV mean gradient 5 mmHg    AV valve area 1.84 cm2    AV Velocity Ratio 0.81     AV index (prosthetic) 0.78     E/A ratio 1.07     Mean e' 0.07 m/s    E wave deceleration time 251.21 msec    IVRT 72.31 msec    LVOT diameter 1.73 cm    LVOT area 2.3 cm2    LVOT peak madhu 1.40 m/s    LVOT peak VTI 31.40 cm    Ao peak madhu 1.72 m/s    Ao VTI 40.1 cm    RVOT peak madhu 0.77 m/s    RVOT peak VTI 21.5 cm    LVOT stroke volume 73.77 cm3    AV peak gradient 12 mmHg    PV mean gradient 1.42 mmHg    E/E' ratio 14.77 m/s    MV Peak E Madhu 0.96 m/s    TR Max Madhu 2.84 m/s    MV Peak A Madhu 0.90 m/s    LV Systolic Volume 29.73 mL    LV Systolic Volume Index 18.8 mL/m2    LV Diastolic Volume 76.85 mL    LV Diastolic Volume Index 48.64 mL/m2    LA Volume Index 24.8 mL/m2    LV Mass Index 80 g/m2    RA Major Axis 3.79 cm    Left Atrium Minor Axis 4.47 cm    Left Atrium Major Axis 4.28 cm    Triscuspid Valve Regurgitation Peak Gradient 32 mmHg    RA Width 2.80 cm    Right Atrial Pressure (from IVC) 8 mmHg    EF 65 %    TV rest pulmonary artery pressure 40 mmHg    Narrative    · The left ventricle is normal in size with concentric remodeling and   normal systolic function.  · Grade II left ventricular diastolic dysfunction.  · The estimated PA systolic pressure is 40 mmHg.  · Normal right ventricular size with normal right ventricular systolic   function.  · Intermediate central venous pressure (8 mmHg).  · The estimated ejection fraction is 65%.  · Mild tricuspid regurgitation.      , EKG: Reviewed, and X-Ray: CXR: X-Ray Chest 1 View (CXR): No results found for this visit on 08/23/22. and X-Ray Chest PA and Lateral (CXR): No results found for this visit on 08/23/22.

## 2022-08-25 NOTE — DISCHARGE SUMMARY
[facility] - [facility] ([facility] Medicine  Discharge Summary      Patient Name: Tania Figueroa  MRN: [id number]  Patient Class: IP- Inpatient  Admission Date: 8/23/2022  Hospital Length of Stay: 2 days  Discharge Date and Time:  08/25/2022 11:07 AM  Attending Physician: Rola Godwin MD   Discharging Provider: Rola Godwin MD  Primary Care Provider: Yasmany Verdugo MD      HPI:   Patient is a 66 y/o  female with PMH of HTN and HLD was brought to ED in [place], [place] by EMS for evaluation of chest pain. She initially believed she was having an anaphylactic reaction to an insect bite, as she became diaphoretic, light-headed, and developed chest pain.  She was given nitroglycerin and aspirin EN route.  On arrival, patient was bradycardic with heart rate of 55.  Troponin 0.048 which trended up to 0.3.  EKG showed nonspecific T-wave abnormalities.  Cardiology was consulted via [facility] who recommended transfer for NSTEMI.  Patient was given 60 mg of Lovenox.  Currently at bedside she denies any chest pain or shortness of breath.            Procedure(s) (LRB):  CATHETERIZATION, HEART, LEFT (Left)  ANGIOGRAM, CORONARY ARTERY (N/A)      Hospital Course:   Admitted for NSTEMI, started on heparin, aspirin, statin. Troponin downtrending, cardiology consulted. Normal coronaries and EF per [facility]. Stable for d/c home with PCP and cards f/u       Goals of Care Treatment Preferences:  Code Status: DNR      Consults:   Consults (From admission, onward)        Status Ordering Provider     Inpatient consult to Cardiology  Once        Provider:  Fabiola Ragsdale MD    Completed RUT, SHAWNA          * NSTEMI (non-ST elevated myocardial infarction)  Chest pain  Troponin trending down  QUETA score: 4  Heparin started  Cardiology consulted, f/u recs  Echo   NPO at midnight     Advance Care Planning     Patient is dnr and sdm is her .           Essential hypertension  Hold BB due to bradycardia  Hydralazine  PRN    Bradycardia  Hold BB      Tobacco abuse  Smoking cessation counseling      HLD (hyperlipidemia)  Continue statin         Final Active Diagnoses:    Diagnosis Date Noted POA    PRINCIPAL PROBLEM:  NSTEMI (non-ST elevated myocardial infarction) [I21.4] 08/23/2022 Yes    Essential hypertension [I10] 08/23/2022 Yes    Tobacco abuse [Z72.0] 08/24/2022 Yes    Bradycardia [R00.1] 08/24/2022 Yes    HLD (hyperlipidemia) [E78.5] 08/23/2022 Yes      Problems Resolved During this Admission:       Discharged Condition: stable    Disposition: Home or Self Care    Follow Up:   Follow-up Information     Yasmany Verdugo MD Follow up in 1 week(s).    Specialty: Internal Medicine  Contact information:  [address] [place] [address]  [address]  [place] [id number] [phone]                       Patient Instructions:      Diet Cardiac     Activity as tolerated       Significant Diagnostic Studies: Labs: All labs within the past 24 hours have been reviewed    Pending Diagnostic Studies:     None         Medications:  Reconciled Home Medications:      Medication List      CONTINUE taking these medications    albuterol 90 mcg/actuation inhaler  Commonly known as: PROVENTIL/VENTOLIN HFA  Inhale 2 puffs into the lungs every 4 (four) hours as needed.     aspirin 81 MG Chew  Take 81 mg by mouth.     atorvastatin 40 MG tablet  Commonly known as: LIPITOR  atorvastatin 40 mg tablet     losartan 25 MG tablet  Commonly known as: COZAAR  losartan 25 mg tablet     PROLIA 60 mg/mL Syrg  Generic drug: denosumab  Inject 60 mg into the skin.        STOP taking these medications    albuterol-ipratropium 2.5 mg-0.5 mg/3 mL nebulizer solution  Commonly known as: DUO-NEB     ascorbic acid (vitamin C) 500 MG tablet  Commonly known as: VITAMIN C     azithromycin 250 MG tablet  Commonly known as: Z-DENILSON     busPIRone 15 MG tablet  Commonly known as: BUSPAR     carvediloL 3.125 MG tablet  Commonly known as: COREG     coenzyme Q10 10 mg capsule     lisinopriL  30 MG tablet  Commonly known as: PRINIVIL,ZESTRIL     loratadine 10 mg tablet  Commonly known as: CLARITIN     meclizine 25 MG tablet  Commonly known as: ANTIVERT     melatonin 10 mg Tab     polyethylene glycol 17 gram/dose powder  Commonly known as: GLYCOLAX     potassium citrate 99 mg Cap     rosuvastatin 20 MG tablet  Commonly known as: CRESTOR            Indwelling Lines/Drains at time of discharge:   Lines/Drains/Airways     None                 Time spent on the discharge of patient: 40 minutes         Rola Godwin MD  Department of Hospital Medicine  [facility] - Telemetry ([facility])

## 2022-08-25 NOTE — ASSESSMENT & PLAN NOTE
-Presents with chest pain symptoms along with elevated troponin  -Continue ASA, statin  -Echo pending  -LHC today by Dr. Ragsdale. All risks, benefits, and treatment alternatives explained to patient in detail. All questions answered. She has agreed to proceed.    8/25/22  -s/p LHC which showed normal coronaries  -Type II NSTEMI secondary to demand  -Continue ASA, statin

## 2022-09-02 NOTE — PHYSICIAN QUERY
PT Name: Tania Figueroa  MR #: [id number]     DOCUMENTATION CLARIFICATION      CDS/: Ashlyn Horowitz RN, CCDS             Contact information: [email or web address]  This form is a permanent document in the medical record.    Query Date: September 2, 2022    By submitting this query, we are merely seeking further clarification of documentation to reflect the severity of illness of your patient. Please utilize your independent clinical judgment when addressing the question(s) below.     The Medical Record contains the following:   Indicators   Supporting Clinical Findings Location in Medical Record   X Chest Pain, Angina  brought to ED in [place], [place] by EMS for evaluation of chest pain.     She initially believed she was having an anaphylactic reaction to an insect bite, as she became diaphoretic, light-headed, and developed chest pain 8/23 h/p    Coronary Artery Disease     X EKG  EKG showed nonspecific T-wave abnormalities 8/24 prog note   X Troponin Troponin 0.048>-0.303>0.106-->0.032 8/22-8/23 lab    Echo Results     X Angiography Normal cors  Normal EF on ventriculogram    s/p [facility] yesterday which showed normal coronaries 8/24 [facility]      8/25 cards note   X Documentation of acute cardiac condition NSTEMI    Admitted for NSTEMI,    -Type II NSTEMI secondary to demand 8/23-8/24 prog notes    8/24 prog  note    8/25 cards note   X Medication/Treatment  She was given nitroglycerin and aspirin EN route.    started on heparin, aspirin, statin 8/23 h/p      8/24 prog note    Other        Provider, please clarify the diagnosis related to the above documentation:  [  x ] NSTEMI     [   ] NSTEMI/Myocardial Infarction Type 2 due to  Demand Ischemia 2/2______________  _____________   [   ] Other Cardiac Diagnosis (please specify): _______________     [   ] Clinically Undetermined       Please document in your progress notes daily for the duration of treatment until resolved, and include in your discharge  summary.  Form No. [id number]

## 2022-11-28 PROBLEM — I21.4 NSTEMI (NON-ST ELEVATED MYOCARDIAL INFARCTION): Status: RESOLVED | Noted: 2022-08-23 | Resolved: 2022-11-28

## 2024-11-18 ENCOUNTER — HOSPITAL ENCOUNTER (OUTPATIENT)
Dept: RADIOLOGY | Facility: HOSPITAL | Age: 69
Discharge: HOME | End: 2024-11-18
Attending: INTERNAL MEDICINE
Payer: MEDICARE

## 2024-11-18 DIAGNOSIS — M81.0 OP (OSTEOPOROSIS): ICD-10-CM

## 2024-11-18 PROCEDURE — 77080 DXA BONE DENSITY AXIAL: CPT | Mod: TC

## 2025-01-06 DIAGNOSIS — M81.0 SENILE OSTEOPOROSIS: Primary | ICD-10-CM

## 2025-02-17 ENCOUNTER — INFUSION (OUTPATIENT)
Dept: INFUSION THERAPY | Facility: HOSPITAL | Age: 70
End: 2025-02-17
Attending: INTERNAL MEDICINE
Payer: MEDICARE

## 2025-02-17 VITALS
DIASTOLIC BLOOD PRESSURE: 56 MMHG | BODY MASS INDEX: 16.82 KG/M2 | TEMPERATURE: 98 F | OXYGEN SATURATION: 99 % | WEIGHT: 111 LBS | SYSTOLIC BLOOD PRESSURE: 158 MMHG | HEIGHT: 68 IN | HEART RATE: 65 BPM

## 2025-02-17 DIAGNOSIS — M81.0 SENILE OSTEOPOROSIS: Primary | ICD-10-CM

## 2025-02-17 PROCEDURE — 96372 THER/PROPH/DIAG INJ SC/IM: CPT

## 2025-02-17 PROCEDURE — 63600175 PHARM REV CODE 636 W HCPCS: Mod: JZ,TB | Performed by: INTERNAL MEDICINE

## 2025-02-17 RX ADMIN — ROMOSOZUMAB-AQQG 210 MG: 105 INJECTION, SOLUTION SUBCUTANEOUS at 02:02

## 2025-03-13 DIAGNOSIS — M81.0 SENILE OSTEOPOROSIS: Primary | ICD-10-CM

## 2025-03-17 ENCOUNTER — INFUSION (OUTPATIENT)
Facility: HOSPITAL | Age: 70
End: 2025-03-17
Attending: INTERNAL MEDICINE
Payer: MEDICARE

## 2025-03-17 ENCOUNTER — LAB VISIT (OUTPATIENT)
Dept: LAB | Facility: HOSPITAL | Age: 70
End: 2025-03-17
Payer: MEDICARE

## 2025-03-17 VITALS
TEMPERATURE: 98 F | HEART RATE: 65 BPM | SYSTOLIC BLOOD PRESSURE: 114 MMHG | WEIGHT: 112 LBS | BODY MASS INDEX: 16.97 KG/M2 | HEIGHT: 68 IN | DIASTOLIC BLOOD PRESSURE: 69 MMHG | RESPIRATION RATE: 20 BRPM | OXYGEN SATURATION: 100 %

## 2025-03-17 DIAGNOSIS — M81.0 SENILE OSTEOPOROSIS: Primary | ICD-10-CM

## 2025-03-17 DIAGNOSIS — M81.0 SENILE OSTEOPOROSIS: ICD-10-CM

## 2025-03-17 LAB
ANION GAP SERPL CALC-SCNC: 6 MEQ/L
BUN SERPL-MCNC: 13 MG/DL (ref 9.8–20.1)
CALCIUM SERPL-MCNC: 9.8 MG/DL (ref 8.4–10.2)
CHLORIDE SERPL-SCNC: 105 MMOL/L (ref 98–107)
CO2 SERPL-SCNC: 32 MMOL/L (ref 23–31)
CREAT SERPL-MCNC: 0.74 MG/DL (ref 0.55–1.02)
CREAT/UREA NIT SERPL: 18
GFR SERPLBLD CREATININE-BSD FMLA CKD-EPI: >60 ML/MIN/1.73/M2
GLUCOSE SERPL-MCNC: 57 MG/DL (ref 82–115)
POTASSIUM SERPL-SCNC: 4.1 MMOL/L (ref 3.5–5.1)
SODIUM SERPL-SCNC: 143 MMOL/L (ref 136–145)

## 2025-03-17 PROCEDURE — 96372 THER/PROPH/DIAG INJ SC/IM: CPT

## 2025-03-17 PROCEDURE — 63600175 PHARM REV CODE 636 W HCPCS: Mod: JZ,TB | Performed by: INTERNAL MEDICINE

## 2025-03-17 PROCEDURE — 36415 COLL VENOUS BLD VENIPUNCTURE: CPT

## 2025-03-17 PROCEDURE — 80048 BASIC METABOLIC PNL TOTAL CA: CPT

## 2025-03-17 RX ADMIN — ROMOSOZUMAB-AQQG 210 MG: 105 INJECTION, SOLUTION SUBCUTANEOUS at 02:03

## 2025-04-17 ENCOUNTER — INFUSION (OUTPATIENT)
Facility: HOSPITAL | Age: 70
End: 2025-04-17
Attending: INTERNAL MEDICINE
Payer: MEDICARE

## 2025-04-17 VITALS
RESPIRATION RATE: 16 BRPM | WEIGHT: 109 LBS | HEIGHT: 67 IN | BODY MASS INDEX: 17.11 KG/M2 | DIASTOLIC BLOOD PRESSURE: 63 MMHG | TEMPERATURE: 98 F | HEART RATE: 70 BPM | OXYGEN SATURATION: 98 % | SYSTOLIC BLOOD PRESSURE: 120 MMHG

## 2025-04-17 DIAGNOSIS — M81.0 SENILE OSTEOPOROSIS: Primary | ICD-10-CM

## 2025-04-17 PROCEDURE — 63600175 PHARM REV CODE 636 W HCPCS: Mod: JZ,TB | Performed by: INTERNAL MEDICINE

## 2025-04-17 PROCEDURE — 96372 THER/PROPH/DIAG INJ SC/IM: CPT

## 2025-04-17 RX ADMIN — ROMOSOZUMAB-AQQG 210 MG: 105 INJECTION, SOLUTION SUBCUTANEOUS at 02:04

## 2025-05-16 ENCOUNTER — INFUSION (OUTPATIENT)
Facility: HOSPITAL | Age: 70
End: 2025-05-16
Attending: INTERNAL MEDICINE
Payer: MEDICARE

## 2025-05-16 ENCOUNTER — LAB VISIT (OUTPATIENT)
Dept: LAB | Facility: HOSPITAL | Age: 70
End: 2025-05-16
Attending: INTERNAL MEDICINE
Payer: MEDICARE

## 2025-05-16 VITALS
OXYGEN SATURATION: 98 % | DIASTOLIC BLOOD PRESSURE: 63 MMHG | WEIGHT: 111 LBS | RESPIRATION RATE: 16 BRPM | HEART RATE: 68 BPM | BODY MASS INDEX: 17.42 KG/M2 | TEMPERATURE: 98 F | SYSTOLIC BLOOD PRESSURE: 120 MMHG | HEIGHT: 67 IN

## 2025-05-16 DIAGNOSIS — Z51.81 ENCOUNTER FOR THERAPEUTIC DRUG MONITORING: Primary | ICD-10-CM

## 2025-05-16 DIAGNOSIS — R30.0 DYSURIA: ICD-10-CM

## 2025-05-16 DIAGNOSIS — M81.0 SENILE OSTEOPOROSIS: Primary | ICD-10-CM

## 2025-05-16 DIAGNOSIS — E78.5 HYPERLIPIDEMIA, UNSPECIFIED HYPERLIPIDEMIA TYPE: ICD-10-CM

## 2025-05-16 DIAGNOSIS — N18.30 STAGE 3 CHRONIC KIDNEY DISEASE, UNSPECIFIED WHETHER STAGE 3A OR 3B CKD: ICD-10-CM

## 2025-05-16 LAB
25(OH)D3+25(OH)D2 SERPL-MCNC: 129 NG/ML (ref 30–80)
ALBUMIN SERPL-MCNC: 3.6 G/DL (ref 3.4–4.8)
ALBUMIN/GLOB SERPL: 1.1 RATIO (ref 1.1–2)
ALP SERPL-CCNC: 96 UNIT/L (ref 40–150)
ALT SERPL-CCNC: 18 UNIT/L (ref 0–55)
ANION GAP SERPL CALC-SCNC: 7 MEQ/L
AST SERPL-CCNC: 24 UNIT/L (ref 11–45)
BILIRUB SERPL-MCNC: 0.3 MG/DL
BUN SERPL-MCNC: 17 MG/DL (ref 9.8–20.1)
CALCIUM SERPL-MCNC: 9.9 MG/DL (ref 8.4–10.2)
CHLORIDE SERPL-SCNC: 104 MMOL/L (ref 98–107)
CHOLEST SERPL-MCNC: 148 MG/DL
CHOLEST/HDLC SERPL: 3 {RATIO} (ref 0–5)
CO2 SERPL-SCNC: 30 MMOL/L (ref 23–31)
CREAT SERPL-MCNC: 0.76 MG/DL (ref 0.55–1.02)
CREAT/UREA NIT SERPL: 22
ERYTHROCYTE [DISTWIDTH] IN BLOOD BY AUTOMATED COUNT: 14.5 % (ref 11.5–17)
GFR SERPLBLD CREATININE-BSD FMLA CKD-EPI: >60 ML/MIN/1.73/M2
GLOBULIN SER-MCNC: 3.3 GM/DL (ref 2.4–3.5)
GLUCOSE SERPL-MCNC: 77 MG/DL (ref 82–115)
HCT VFR BLD AUTO: 42.3 % (ref 37–47)
HDLC SERPL-MCNC: 44 MG/DL (ref 35–60)
HGB BLD-MCNC: 13.4 G/DL (ref 12–16)
LDLC SERPL CALC-MCNC: 86 MG/DL (ref 50–140)
MCH RBC QN AUTO: 28.9 PG (ref 27–31)
MCHC RBC AUTO-ENTMCNC: 31.7 G/DL (ref 33–36)
MCV RBC AUTO: 91.2 FL (ref 80–94)
NRBC BLD AUTO-RTO: 0 %
PLATELET # BLD AUTO: 229 X10(3)/MCL (ref 130–400)
PMV BLD AUTO: 10.1 FL (ref 7.4–10.4)
POTASSIUM SERPL-SCNC: 4.2 MMOL/L (ref 3.5–5.1)
PROT SERPL-MCNC: 6.9 GM/DL (ref 5.8–7.6)
RBC # BLD AUTO: 4.64 X10(6)/MCL (ref 4.2–5.4)
SODIUM SERPL-SCNC: 141 MMOL/L (ref 136–145)
TRIGL SERPL-MCNC: 89 MG/DL (ref 37–140)
URATE SERPL-MCNC: 3.7 MG/DL (ref 2.6–6)
VLDLC SERPL CALC-MCNC: 18 MG/DL
WBC # BLD AUTO: 9.42 X10(3)/MCL (ref 4.5–11.5)

## 2025-05-16 PROCEDURE — 36415 COLL VENOUS BLD VENIPUNCTURE: CPT

## 2025-05-16 PROCEDURE — 96372 THER/PROPH/DIAG INJ SC/IM: CPT

## 2025-05-16 PROCEDURE — 84550 ASSAY OF BLOOD/URIC ACID: CPT

## 2025-05-16 PROCEDURE — 85027 COMPLETE CBC AUTOMATED: CPT

## 2025-05-16 PROCEDURE — 63600175 PHARM REV CODE 636 W HCPCS: Mod: JZ,TB | Performed by: INTERNAL MEDICINE

## 2025-05-16 PROCEDURE — 82306 VITAMIN D 25 HYDROXY: CPT

## 2025-05-16 PROCEDURE — 80053 COMPREHEN METABOLIC PANEL: CPT

## 2025-05-16 PROCEDURE — 80061 LIPID PANEL: CPT

## 2025-05-16 RX ADMIN — ROMOSOZUMAB-AQQG 210 MG: 105 INJECTION, SOLUTION SUBCUTANEOUS at 09:05

## 2025-05-16 NOTE — PLAN OF CARE
pt will receive therapeutic injection safely and within timely manner pt will receive therapeutic injection safely and within timely manner

## 2025-06-16 ENCOUNTER — INFUSION (OUTPATIENT)
Facility: HOSPITAL | Age: 70
End: 2025-06-16
Attending: INTERNAL MEDICINE
Payer: MEDICARE

## 2025-06-16 VITALS
WEIGHT: 110.19 LBS | TEMPERATURE: 98 F | OXYGEN SATURATION: 100 % | BODY MASS INDEX: 17.29 KG/M2 | SYSTOLIC BLOOD PRESSURE: 105 MMHG | DIASTOLIC BLOOD PRESSURE: 64 MMHG | HEIGHT: 67 IN | HEART RATE: 76 BPM

## 2025-06-16 DIAGNOSIS — M81.0 SENILE OSTEOPOROSIS: Primary | ICD-10-CM

## 2025-06-16 PROCEDURE — 96372 THER/PROPH/DIAG INJ SC/IM: CPT

## 2025-06-16 PROCEDURE — 63600175 PHARM REV CODE 636 W HCPCS: Mod: JZ,TB | Performed by: INTERNAL MEDICINE

## 2025-06-16 RX ADMIN — ROMOSOZUMAB-AQQG 210 MG: 105 INJECTION, SOLUTION SUBCUTANEOUS at 02:06

## 2025-07-16 ENCOUNTER — INFUSION (OUTPATIENT)
Facility: HOSPITAL | Age: 70
End: 2025-07-16
Attending: INTERNAL MEDICINE
Payer: MEDICARE

## 2025-07-16 VITALS
HEART RATE: 64 BPM | BODY MASS INDEX: 17.42 KG/M2 | HEIGHT: 67 IN | TEMPERATURE: 98 F | OXYGEN SATURATION: 97 % | DIASTOLIC BLOOD PRESSURE: 63 MMHG | RESPIRATION RATE: 20 BRPM | SYSTOLIC BLOOD PRESSURE: 98 MMHG | WEIGHT: 111 LBS

## 2025-07-16 DIAGNOSIS — M81.0 SENILE OSTEOPOROSIS: Primary | ICD-10-CM

## 2025-07-16 PROCEDURE — 96372 THER/PROPH/DIAG INJ SC/IM: CPT

## 2025-07-16 PROCEDURE — 63600175 PHARM REV CODE 636 W HCPCS: Mod: JZ,TB | Performed by: INTERNAL MEDICINE

## 2025-07-16 RX ADMIN — ROMOSOZUMAB-AQQG 210 MG: 105 INJECTION, SOLUTION SUBCUTANEOUS at 01:07

## 2025-08-18 ENCOUNTER — INFUSION (OUTPATIENT)
Facility: HOSPITAL | Age: 70
End: 2025-08-18
Attending: INTERNAL MEDICINE
Payer: MEDICARE

## 2025-08-18 ENCOUNTER — LAB VISIT (OUTPATIENT)
Dept: LAB | Facility: HOSPITAL | Age: 70
End: 2025-08-18
Payer: MEDICARE

## 2025-08-18 VITALS
DIASTOLIC BLOOD PRESSURE: 69 MMHG | BODY MASS INDEX: 17.86 KG/M2 | HEIGHT: 67 IN | WEIGHT: 113.81 LBS | OXYGEN SATURATION: 97 % | HEART RATE: 62 BPM | TEMPERATURE: 99 F | SYSTOLIC BLOOD PRESSURE: 125 MMHG

## 2025-08-18 DIAGNOSIS — M81.0 SENILE OSTEOPOROSIS: Primary | ICD-10-CM

## 2025-08-18 DIAGNOSIS — M81.0 SENILE OSTEOPOROSIS: ICD-10-CM

## 2025-08-18 LAB
ANION GAP SERPL CALC-SCNC: 7 MEQ/L
BUN SERPL-MCNC: 9 MG/DL (ref 9.8–20.1)
CALCIUM SERPL-MCNC: 9.5 MG/DL (ref 8.4–10.2)
CHLORIDE SERPL-SCNC: 105 MMOL/L (ref 98–107)
CO2 SERPL-SCNC: 30 MMOL/L (ref 23–31)
CREAT SERPL-MCNC: 0.8 MG/DL (ref 0.55–1.02)
CREAT/UREA NIT SERPL: 11
GFR SERPLBLD CREATININE-BSD FMLA CKD-EPI: >60 ML/MIN/1.73/M2
GLUCOSE SERPL-MCNC: 116 MG/DL (ref 82–115)
POTASSIUM SERPL-SCNC: 3.9 MMOL/L (ref 3.5–5.1)
SODIUM SERPL-SCNC: 142 MMOL/L (ref 136–145)

## 2025-08-18 PROCEDURE — 36415 COLL VENOUS BLD VENIPUNCTURE: CPT

## 2025-08-18 PROCEDURE — 96372 THER/PROPH/DIAG INJ SC/IM: CPT

## 2025-08-18 PROCEDURE — 80048 BASIC METABOLIC PNL TOTAL CA: CPT

## 2025-08-18 PROCEDURE — 63600175 PHARM REV CODE 636 W HCPCS: Mod: JZ,TB | Performed by: INTERNAL MEDICINE

## 2025-08-18 RX ADMIN — ROMOSOZUMAB-AQQG 210 MG: 105 INJECTION, SOLUTION SUBCUTANEOUS at 01:08

## (undated) DEVICE — BAND TR COMP DEVICE REG 24CM

## (undated) DEVICE — KIT MANIFOLD LOW PRESS TUBING

## (undated) DEVICE — GUIDEWIRE EMERALD .035IN 260CM

## (undated) DEVICE — CATH JR4 5FR

## (undated) DEVICE — PACK CATH LAB CUSTOM BR

## (undated) DEVICE — GUIDEWIRE WHOLEY HI TORQ 175CM

## (undated) DEVICE — CATH INFINITI MULTIPAK JR4 5FR

## (undated) DEVICE — KIT SYR REUSABLE

## (undated) DEVICE — CONTRAST VISIPAQUE 50ML

## (undated) DEVICE — CATH PIG145 INFINITI 5X110CM

## (undated) DEVICE — CATH JL4 5FR

## (undated) DEVICE — KIT GLIDESHEATH SLEND 6FR 10CM

## (undated) DEVICE — ANGIOTOUCH KIT